# Patient Record
Sex: MALE | Race: BLACK OR AFRICAN AMERICAN | NOT HISPANIC OR LATINO | Employment: UNEMPLOYED | URBAN - METROPOLITAN AREA
[De-identification: names, ages, dates, MRNs, and addresses within clinical notes are randomized per-mention and may not be internally consistent; named-entity substitution may affect disease eponyms.]

---

## 2021-05-14 ENCOUNTER — HISTORICAL (OUTPATIENT)
Dept: LAB | Facility: HOSPITAL | Age: 41
End: 2021-05-14

## 2022-04-07 ENCOUNTER — HISTORICAL (OUTPATIENT)
Dept: ADMINISTRATIVE | Facility: HOSPITAL | Age: 42
End: 2022-04-07
Payer: MEDICAID

## 2022-04-23 VITALS
OXYGEN SATURATION: 97 % | BODY MASS INDEX: 31.94 KG/M2 | HEIGHT: 70 IN | SYSTOLIC BLOOD PRESSURE: 133 MMHG | WEIGHT: 223.13 LBS | DIASTOLIC BLOOD PRESSURE: 88 MMHG

## 2022-05-20 ENCOUNTER — LAB VISIT (OUTPATIENT)
Dept: LAB | Facility: HOSPITAL | Age: 42
End: 2022-05-20
Attending: NURSE PRACTITIONER
Payer: MEDICAID

## 2022-05-20 DIAGNOSIS — Z00.00 WELL ADULT EXAM: Primary | ICD-10-CM

## 2022-05-20 DIAGNOSIS — Z12.5 SCREENING PSA (PROSTATE SPECIFIC ANTIGEN): ICD-10-CM

## 2022-05-20 LAB
ALBUMIN SERPL-MCNC: 3.9 GM/DL (ref 3.5–5)
ALBUMIN/GLOB SERPL: 1.1 RATIO (ref 1.1–2)
ALP SERPL-CCNC: 101 UNIT/L (ref 40–150)
ALT SERPL-CCNC: 13 UNIT/L (ref 0–55)
AST SERPL-CCNC: 23 UNIT/L (ref 5–34)
BASOPHILS # BLD AUTO: 0.07 X10(3)/MCL (ref 0–0.2)
BASOPHILS NFR BLD AUTO: 0.7 %
BILIRUBIN DIRECT+TOT PNL SERPL-MCNC: 0.7 MG/DL
BUN SERPL-MCNC: 10.9 MG/DL (ref 8.9–20.6)
CALCIUM SERPL-MCNC: 9.5 MG/DL (ref 8.4–10.2)
CHLORIDE SERPL-SCNC: 109 MMOL/L (ref 98–107)
CHOLEST SERPL-MCNC: 211 MG/DL
CHOLEST/HDLC SERPL: 7 {RATIO} (ref 0–5)
CO2 SERPL-SCNC: 25 MMOL/L (ref 22–29)
CREAT SERPL-MCNC: 1.01 MG/DL (ref 0.73–1.18)
EOSINOPHIL # BLD AUTO: 0.56 X10(3)/MCL (ref 0–0.9)
EOSINOPHIL NFR BLD AUTO: 5.3 %
ERYTHROCYTE [DISTWIDTH] IN BLOOD BY AUTOMATED COUNT: 13.9 % (ref 11.5–17)
EST. AVERAGE GLUCOSE BLD GHB EST-MCNC: 108.3 MG/DL
GLOBULIN SER-MCNC: 3.6 GM/DL (ref 2.4–3.5)
GLUCOSE SERPL-MCNC: 95 MG/DL (ref 74–100)
HBA1C MFR BLD: 5.4 %
HCT VFR BLD AUTO: 45.9 % (ref 42–52)
HDLC SERPL-MCNC: 29 MG/DL (ref 35–60)
HGB BLD-MCNC: 14.6 GM/DL (ref 14–18)
IMM GRANULOCYTES # BLD AUTO: 0.04 X10(3)/MCL (ref 0–0.02)
IMM GRANULOCYTES NFR BLD AUTO: 0.4 % (ref 0–0.43)
LDLC SERPL CALC-MCNC: 139 MG/DL (ref 50–140)
LYMPHOCYTES # BLD AUTO: 3.93 X10(3)/MCL (ref 0.6–4.6)
LYMPHOCYTES NFR BLD AUTO: 37.4 %
MCH RBC QN AUTO: 27.4 PG (ref 27–31)
MCHC RBC AUTO-ENTMCNC: 31.8 MG/DL (ref 33–36)
MCV RBC AUTO: 86.3 FL (ref 80–94)
MONOCYTES # BLD AUTO: 0.65 X10(3)/MCL (ref 0.1–1.3)
MONOCYTES NFR BLD AUTO: 6.2 %
NEUTROPHILS # BLD AUTO: 5.3 X10(3)/MCL (ref 2.1–9.2)
NEUTROPHILS NFR BLD AUTO: 50 %
PLATELET # BLD AUTO: 317 X10(3)/MCL (ref 130–400)
PMV BLD AUTO: 9.8 FL (ref 9.4–12.4)
POTASSIUM SERPL-SCNC: 3.9 MMOL/L (ref 3.5–5.1)
PROT SERPL-MCNC: 7.5 GM/DL (ref 6.4–8.3)
PSA SERPL-MCNC: 1.45 NG/ML
RBC # BLD AUTO: 5.32 X10(6)/MCL (ref 4.7–6.1)
SODIUM SERPL-SCNC: 143 MMOL/L (ref 136–145)
TRIGL SERPL-MCNC: 214 MG/DL (ref 34–140)
TSH SERPL-ACNC: 2.43 UIU/ML (ref 0.35–4.94)
VLDLC SERPL CALC-MCNC: 43 MG/DL
WBC # SPEC AUTO: 10.5 X10(3)/MCL (ref 4.5–11.5)

## 2022-05-20 PROCEDURE — 36415 COLL VENOUS BLD VENIPUNCTURE: CPT

## 2022-05-20 PROCEDURE — 83036 HEMOGLOBIN GLYCOSYLATED A1C: CPT

## 2022-05-20 PROCEDURE — 84443 ASSAY THYROID STIM HORMONE: CPT

## 2022-05-20 PROCEDURE — 85025 COMPLETE CBC W/AUTO DIFF WBC: CPT

## 2022-05-20 PROCEDURE — 80053 COMPREHEN METABOLIC PANEL: CPT

## 2022-05-20 PROCEDURE — 84153 ASSAY OF PSA TOTAL: CPT

## 2022-05-20 PROCEDURE — 82306 VITAMIN D 25 HYDROXY: CPT

## 2022-05-20 PROCEDURE — 80061 LIPID PANEL: CPT

## 2022-05-21 LAB — DEPRECATED CALCIDIOL+CALCIFEROL SERPL-MC: 21.6 NG/ML (ref 30–80)

## 2022-05-26 ENCOUNTER — OFFICE VISIT (OUTPATIENT)
Dept: FAMILY MEDICINE | Facility: CLINIC | Age: 42
End: 2022-05-26
Payer: MEDICAID

## 2022-05-26 VITALS
HEART RATE: 81 BPM | OXYGEN SATURATION: 98 % | SYSTOLIC BLOOD PRESSURE: 134 MMHG | HEIGHT: 69 IN | BODY MASS INDEX: 33.03 KG/M2 | TEMPERATURE: 98 F | WEIGHT: 223 LBS | DIASTOLIC BLOOD PRESSURE: 87 MMHG | RESPIRATION RATE: 18 BRPM

## 2022-05-26 DIAGNOSIS — Z00.00 WELLNESS EXAMINATION: Primary | ICD-10-CM

## 2022-05-26 DIAGNOSIS — I10 HYPERTENSION, UNSPECIFIED TYPE: ICD-10-CM

## 2022-05-26 DIAGNOSIS — E78.00 ELEVATED CHOLESTEROL: ICD-10-CM

## 2022-05-26 PROCEDURE — 3075F SYST BP GE 130 - 139MM HG: CPT | Mod: CPTII,,, | Performed by: NURSE PRACTITIONER

## 2022-05-26 PROCEDURE — 1159F PR MEDICATION LIST DOCUMENTED IN MEDICAL RECORD: ICD-10-PCS | Mod: CPTII,,, | Performed by: NURSE PRACTITIONER

## 2022-05-26 PROCEDURE — 3075F PR MOST RECENT SYSTOLIC BLOOD PRESS GE 130-139MM HG: ICD-10-PCS | Mod: CPTII,,, | Performed by: NURSE PRACTITIONER

## 2022-05-26 PROCEDURE — 3008F PR BODY MASS INDEX (BMI) DOCUMENTED: ICD-10-PCS | Mod: CPTII,,, | Performed by: NURSE PRACTITIONER

## 2022-05-26 PROCEDURE — 3008F BODY MASS INDEX DOCD: CPT | Mod: CPTII,,, | Performed by: NURSE PRACTITIONER

## 2022-05-26 PROCEDURE — 1160F RVW MEDS BY RX/DR IN RCRD: CPT | Mod: CPTII,,, | Performed by: NURSE PRACTITIONER

## 2022-05-26 PROCEDURE — 1159F MED LIST DOCD IN RCRD: CPT | Mod: CPTII,,, | Performed by: NURSE PRACTITIONER

## 2022-05-26 PROCEDURE — 3079F DIAST BP 80-89 MM HG: CPT | Mod: CPTII,,, | Performed by: NURSE PRACTITIONER

## 2022-05-26 PROCEDURE — 1160F PR REVIEW ALL MEDS BY PRESCRIBER/CLIN PHARMACIST DOCUMENTED: ICD-10-PCS | Mod: CPTII,,, | Performed by: NURSE PRACTITIONER

## 2022-05-26 PROCEDURE — 99396 PR PREVENTIVE VISIT,EST,40-64: ICD-10-PCS | Mod: ,,, | Performed by: NURSE PRACTITIONER

## 2022-05-26 PROCEDURE — 99396 PREV VISIT EST AGE 40-64: CPT | Mod: ,,, | Performed by: NURSE PRACTITIONER

## 2022-05-26 PROCEDURE — 3079F PR MOST RECENT DIASTOLIC BLOOD PRESSURE 80-89 MM HG: ICD-10-PCS | Mod: CPTII,,, | Performed by: NURSE PRACTITIONER

## 2022-05-26 RX ORDER — HYDROCHLOROTHIAZIDE 12.5 MG/1
12.5 TABLET ORAL DAILY
Qty: 90 TABLET | Refills: 1 | Status: SHIPPED | OUTPATIENT
Start: 2022-05-26 | End: 2022-07-10 | Stop reason: SDUPTHER

## 2022-05-26 NOTE — PROGRESS NOTES
"Subjective:       Patient ID: Austyn Parks is a 41 y.o. male.    Chief Complaint: Annual Exam      HPI   This is a 42-year-old  male presents to the clinic today for annual wellness and diarrhea.  The patient's general health status is described as good.  PMH hypertension.  Patient reports compliance with blood pressure medicine.  Denies headaches, dizziness or blurred vision.  Smoking status less than half a pack a day.    Review of Systems   Constitutional: Negative for activity change and fatigue.   Respiratory: Negative for chest tightness and shortness of breath.    Cardiovascular: Negative for chest pain and palpitations.   Gastrointestinal: Negative for abdominal pain.   Neurological: Negative for dizziness, weakness and headaches.   Psychiatric/Behavioral: Negative for self-injury and suicidal ideas.   All other systems reviewed and are negative.           The patients  hx, allergies, medication and and problem list were updated as appropriate.    Objective:       /87 (BP Location: Right arm, Patient Position: Sitting, BP Method: Medium (Automatic))   Pulse 81   Temp 97.9 °F (36.6 °C) (Oral)   Resp 18   Ht 5' 9" (1.753 m)   Wt 101.2 kg (223 lb)   SpO2 98%   BMI 32.93 kg/m²      Physical Exam  Constitutional:       General: He is not in acute distress.     Appearance: Normal appearance. He is not toxic-appearing.   HENT:      Head: Normocephalic.   Eyes:      Pupils: Pupils are equal, round, and reactive to light.   Cardiovascular:      Rate and Rhythm: Normal rate.      Pulses: Normal pulses.   Pulmonary:      Effort: Pulmonary effort is normal.   Abdominal:      General: Bowel sounds are normal.      Palpations: Abdomen is soft.   Musculoskeletal:         General: Normal range of motion.      Cervical back: Neck supple.   Skin:     General: Skin is warm and dry.   Neurological:      Mental Status: He is alert and oriented to person, place, and time.   Psychiatric:         Mood and " Affect: Mood normal.         Behavior: Behavior normal.         Judgment: Judgment normal.           Assessment/Plan   1. Wellness examination  Discussed labs and preventative screenings   Vaccination status     Colon- no family history start screening at 45  PSA- PSA value within normal repeat in 1 year      2. Hypertension, unspecified type  -     hydroCHLOROthiazide (HYDRODIURIL) 12.5 MG Tab  Refill  blood pressure medicine  Controlled with medication.   Continue current medication as prescribed   Low salt/ DASH diet   Discussed lifestyle modifications.   encourage aerobic excise at least 30 mins a day   Monitor BP daily goal less than 140/90.   Denies headaches, blurred vision, or dizziness      3. Elevated cholesterol    Patient reports he was not fasting when he completed lab work.  Total cholesterol 211.   epeat fasting lipid panel in 4 months.  Low fat, low cholesterol diet .  Increase dietary fiber  Avoid fried, fatty , high saturated fats.  Add flaxseed or fish oil omega supplement to diet .   exercise to reduce LDL and raise HDL. Exercise at least 30 mins a day as tolerated         No results found for this or any previous visit (from the past 24 hour(s)).   Follow up in about 4 months (around 9/26/2022).

## 2022-07-10 ENCOUNTER — HOSPITAL ENCOUNTER (EMERGENCY)
Facility: HOSPITAL | Age: 42
Discharge: HOME | End: 2022-07-10
Attending: SPECIALIST
Payer: MEDICAID

## 2022-07-10 VITALS
BODY MASS INDEX: 34.86 KG/M2 | SYSTOLIC BLOOD PRESSURE: 120 MMHG | OXYGEN SATURATION: 96 % | DIASTOLIC BLOOD PRESSURE: 86 MMHG | HEART RATE: 51 BPM | HEIGHT: 68 IN | WEIGHT: 230 LBS | RESPIRATION RATE: 16 BRPM | TEMPERATURE: 99 F

## 2022-07-10 DIAGNOSIS — I10 HYPERTENSION, UNSPECIFIED TYPE: ICD-10-CM

## 2022-07-10 DIAGNOSIS — K59.00 CONSTIPATION, UNSPECIFIED CONSTIPATION TYPE: Primary | ICD-10-CM

## 2022-07-10 DIAGNOSIS — R10.9 ABDOMINAL PAIN: ICD-10-CM

## 2022-07-10 LAB
ALBUMIN SERPL-MCNC: 3.9 GM/DL (ref 3.5–5)
ALBUMIN/GLOB SERPL: 1.2 RATIO (ref 1.1–2)
ALP SERPL-CCNC: 95 UNIT/L (ref 40–150)
ALT SERPL-CCNC: 13 UNIT/L (ref 0–55)
AST SERPL-CCNC: 14 UNIT/L (ref 5–34)
BASOPHILS # BLD AUTO: 0.1 X10(3)/MCL (ref 0–0.2)
BASOPHILS NFR BLD AUTO: 0.7 %
BILIRUBIN DIRECT+TOT PNL SERPL-MCNC: 0.7 MG/DL
BUN SERPL-MCNC: 11 MG/DL (ref 8.9–20.6)
CALCIUM SERPL-MCNC: 9.6 MG/DL (ref 8.4–10.2)
CHLORIDE SERPL-SCNC: 107 MMOL/L (ref 98–107)
CO2 SERPL-SCNC: 21 MMOL/L (ref 22–29)
CREAT SERPL-MCNC: 1.21 MG/DL (ref 0.73–1.18)
EOSINOPHIL # BLD AUTO: 0.31 X10(3)/MCL (ref 0–0.9)
EOSINOPHIL NFR BLD AUTO: 2.1 %
ERYTHROCYTE [DISTWIDTH] IN BLOOD BY AUTOMATED COUNT: 13.4 % (ref 11.5–17)
GLOBULIN SER-MCNC: 3.2 GM/DL (ref 2.4–3.5)
GLUCOSE SERPL-MCNC: 92 MG/DL (ref 74–100)
HCT VFR BLD AUTO: 44.2 % (ref 42–52)
HGB BLD-MCNC: 14.2 GM/DL (ref 14–18)
IMM GRANULOCYTES # BLD AUTO: 0.05 X10(3)/MCL (ref 0–0.04)
IMM GRANULOCYTES NFR BLD AUTO: 0.3 %
LIPASE SERPL-CCNC: 73 U/L
LYMPHOCYTES # BLD AUTO: 3.75 X10(3)/MCL (ref 0.6–4.6)
LYMPHOCYTES NFR BLD AUTO: 25.8 %
MCH RBC QN AUTO: 27.2 PG (ref 27–31)
MCHC RBC AUTO-ENTMCNC: 32.1 MG/DL (ref 33–36)
MCV RBC AUTO: 84.5 FL (ref 80–94)
MONOCYTES # BLD AUTO: 0.96 X10(3)/MCL (ref 0.1–1.3)
MONOCYTES NFR BLD AUTO: 6.6 %
NEUTROPHILS # BLD AUTO: 9.4 X10(3)/MCL (ref 2.1–9.2)
NEUTROPHILS NFR BLD AUTO: 64.5 %
PLATELET # BLD AUTO: 344 X10(3)/MCL (ref 130–400)
PMV BLD AUTO: 9.6 FL (ref 7.4–10.4)
POTASSIUM SERPL-SCNC: 3.8 MMOL/L (ref 3.5–5.1)
PROT SERPL-MCNC: 7.1 GM/DL (ref 6.4–8.3)
RBC # BLD AUTO: 5.23 X10(6)/MCL (ref 4.7–6.1)
SODIUM SERPL-SCNC: 139 MMOL/L (ref 136–145)
WBC # SPEC AUTO: 14.6 X10(3)/MCL (ref 4.5–11.5)

## 2022-07-10 PROCEDURE — 36415 COLL VENOUS BLD VENIPUNCTURE: CPT | Performed by: SPECIALIST

## 2022-07-10 PROCEDURE — 85025 COMPLETE CBC W/AUTO DIFF WBC: CPT | Performed by: SPECIALIST

## 2022-07-10 PROCEDURE — 25000003 PHARM REV CODE 250: Performed by: SPECIALIST

## 2022-07-10 PROCEDURE — 83690 ASSAY OF LIPASE: CPT | Performed by: SPECIALIST

## 2022-07-10 PROCEDURE — 99284 EMERGENCY DEPT VISIT MOD MDM: CPT | Mod: 25

## 2022-07-10 PROCEDURE — 80053 COMPREHEN METABOLIC PANEL: CPT | Performed by: SPECIALIST

## 2022-07-10 RX ORDER — MAG HYDROX/ALUMINUM HYD/SIMETH 200-200-20
5 SUSPENSION, ORAL (FINAL DOSE FORM) ORAL
Status: COMPLETED | OUTPATIENT
Start: 2022-07-10 | End: 2022-07-10

## 2022-07-10 RX ORDER — DICYCLOMINE HYDROCHLORIDE 10 MG/1
20 CAPSULE ORAL ONCE
Status: DISCONTINUED | OUTPATIENT
Start: 2022-07-10 | End: 2022-07-10

## 2022-07-10 RX ORDER — SYRING-NEEDL,DISP,INSUL,0.3 ML 29 G X1/2"
296 SYRINGE, EMPTY DISPOSABLE MISCELLANEOUS ONCE
Qty: 600 ML | Refills: 0 | Status: SHIPPED | OUTPATIENT
Start: 2022-07-10 | End: 2022-07-10

## 2022-07-10 RX ORDER — HYOSCYAMINE SULFATE 0.12 MG/1
0.12 TABLET SUBLINGUAL
Status: COMPLETED | OUTPATIENT
Start: 2022-07-10 | End: 2022-07-10

## 2022-07-10 RX ORDER — DICYCLOMINE HYDROCHLORIDE 10 MG/1
10 CAPSULE ORAL
Status: DISCONTINUED | OUTPATIENT
Start: 2022-07-10 | End: 2022-07-10

## 2022-07-10 RX ORDER — DICYCLOMINE HYDROCHLORIDE 10 MG/1
20 CAPSULE ORAL
Status: DISCONTINUED | OUTPATIENT
Start: 2022-07-10 | End: 2022-07-10

## 2022-07-10 RX ORDER — DICYCLOMINE HYDROCHLORIDE 20 MG/1
20 TABLET ORAL 2 TIMES DAILY
Qty: 10 TABLET | Refills: 0 | Status: SHIPPED | OUTPATIENT
Start: 2022-07-10 | End: 2022-07-15

## 2022-07-10 RX ADMIN — ALUMINUM HYDROXIDE, MAGNESIUM HYDROXIDE, AND SIMETHICONE 5 ML: 200; 200; 20 SUSPENSION ORAL at 05:07

## 2022-07-10 RX ADMIN — HYOSCYAMINE SULFATE 0.12 MG: 0.12 TABLET ORAL at 05:07

## 2022-07-10 NOTE — ED PROVIDER NOTES
Encounter Date: 7/10/2022       History     Chief Complaint   Patient presents with    Abdominal Pain     Reports having abd cramps and constipation for the past 2 days.      40 yo M presents to ED w/ c/o Abdominal cramps generalized and seems to be moving around; trouble having BMs; no N/V; took pepto bismol and drank Sprite without relief. Denies fever/ chills        Review of patient's allergies indicates:  No Known Allergies  Past Medical History:   Diagnosis Date    HTN (hypertension)     Obesity, unspecified      No past surgical history on file.  Family History   Problem Relation Age of Onset    Hypertension Mother     Diabetes Mother     Diabetes Brother      Social History     Tobacco Use    Smoking status: Current Every Day Smoker     Packs/day: 0.50     Types: Cigarettes    Smokeless tobacco: Never Used   Substance Use Topics    Alcohol use: Not Currently    Drug use: Not Currently     Types: Marijuana     Review of Systems   Constitutional: Negative.    Respiratory: Negative.    Cardiovascular: Negative.    Gastrointestinal: Negative.    Genitourinary: Negative.    Musculoskeletal: Negative.    Neurological: Negative.        Physical Exam     Initial Vitals [07/10/22 0505]   BP Pulse Resp Temp SpO2   (!) 143/107 65 16 98.6 °F (37 °C) 95 %      MAP       --         Physical Exam    Nursing note and vitals reviewed.  Constitutional: He appears well-developed and well-nourished.   HENT:   Head: Normocephalic and atraumatic.   Eyes: EOM are normal. Pupils are equal, round, and reactive to light.   Neck: Neck supple.   Normal range of motion.  Cardiovascular: Normal rate, regular rhythm and normal heart sounds.   Pulmonary/Chest: Breath sounds normal.   Abdominal: Abdomen is soft. Bowel sounds are normal. He exhibits distension (mild). There is abdominal tenderness (mild, generalized). There is no rebound and no guarding.   Musculoskeletal:         General: Normal range of motion.      Cervical  back: Normal range of motion and neck supple.     Neurological: He is alert and oriented to person, place, and time.   Skin: Skin is warm and dry.         ED Course   Procedures  Labs Reviewed   COMPREHENSIVE METABOLIC PANEL - Abnormal; Notable for the following components:       Result Value    Carbon Dioxide 21 (*)     Creatinine 1.21 (*)     All other components within normal limits   LIPASE - Abnormal; Notable for the following components:    Lipase Level 73 (*)     All other components within normal limits   CBC WITH DIFFERENTIAL - Abnormal; Notable for the following components:    WBC 14.6 (*)     MCHC 32.1 (*)     Neut # 9.4 (*)     IG# 0.05 (*)     All other components within normal limits   CBC W/ AUTO DIFFERENTIAL    Narrative:     The following orders were created for panel order CBC auto differential.  Procedure                               Abnormality         Status                     ---------                               -----------         ------                     CBC with Differential[[id number]]        Abnormal            Final result                 Please view results for these tests on the individual orders.          Imaging Results          X-Ray Abdomen AP 1 View (KUB) (Preliminary result)  Result time 07/10/22 05:53:13    ED Interpretation by Williams Shafer MD (07/10/22 05:53:13, [facility] - Emergency Dept, Emergency Medicine)    No bowel dilatation, an element of constipation                               Medications   hyoscyamine ODT 0.125 mg (0.125 mg Oral Given 7/10/22 0530)   aluminum-magnesium hydroxide-simethicone 200-200-20 mg/5 mL suspension 5 mL (5 mLs Oral Given 7/10/22 0530)                          Clinical Impression:   Final diagnoses:  [R10.9] Abdominal pain  [K59.00] Constipation, unspecified constipation type (Primary)          ED Disposition Condition    Discharge Stable        ED Prescriptions     Medication Sig Dispense Start Date End Date Auth. Provider     magnesium citrate solution (Expires today) Take 296 mLs by mouth once. May take additional dose if needed for 1 dose 600 mL 7/10/2022 7/10/2022 Ashley Alba MD    dicyclomine (BENTYL) 20 mg tablet Take 1 tablet (20 mg total) by mouth 2 (two) times daily. for 5 days 10 tablet 7/10/2022 7/15/2022 Ashley Alba MD        Follow-up Information     Follow up With Specialties Details Why Contact Info    Wander Car, [facility] In 1 week  [address] [facility] [id number] [phone]             Ashley Alba MD  07/10/22 6117

## 2022-07-10 NOTE — ED NOTES
Reports having generalized abd cramping for the past 2 days, and has been unable to have a normal bowel movement. Denies nvd, but reports associated chills when he catches a cramp.

## 2022-07-11 RX ORDER — HYDROCHLOROTHIAZIDE 12.5 MG/1
12.5 TABLET ORAL DAILY
Qty: 30 TABLET | Refills: 2 | Status: SHIPPED | OUTPATIENT
Start: 2022-07-11 | End: 2023-08-28 | Stop reason: SDUPTHER

## 2022-09-19 DIAGNOSIS — E78.5 HYPERLIPIDEMIA, UNSPECIFIED HYPERLIPIDEMIA TYPE: Primary | ICD-10-CM

## 2022-09-19 DIAGNOSIS — I10 HYPERTENSION, UNSPECIFIED TYPE: ICD-10-CM

## 2022-09-28 ENCOUNTER — OFFICE VISIT (OUTPATIENT)
Dept: FAMILY MEDICINE | Facility: CLINIC | Age: 42
End: 2022-09-28
Payer: MEDICAID

## 2022-09-28 ENCOUNTER — LAB VISIT (OUTPATIENT)
Dept: LAB | Facility: HOSPITAL | Age: 42
End: 2022-09-28
Attending: PHYSICIAN ASSISTANT
Payer: MEDICAID

## 2022-09-28 VITALS
OXYGEN SATURATION: 99 % | BODY MASS INDEX: 32.19 KG/M2 | HEIGHT: 68 IN | HEART RATE: 66 BPM | DIASTOLIC BLOOD PRESSURE: 89 MMHG | SYSTOLIC BLOOD PRESSURE: 127 MMHG | RESPIRATION RATE: 16 BRPM | WEIGHT: 212.38 LBS

## 2022-09-28 DIAGNOSIS — E78.5 HYPERLIPIDEMIA, UNSPECIFIED HYPERLIPIDEMIA TYPE: ICD-10-CM

## 2022-09-28 DIAGNOSIS — Z72.0 TOBACCO USE: ICD-10-CM

## 2022-09-28 DIAGNOSIS — I10 HYPERTENSION, UNSPECIFIED TYPE: ICD-10-CM

## 2022-09-28 DIAGNOSIS — E78.00 HYPERCHOLESTEROLEMIA: Primary | ICD-10-CM

## 2022-09-28 LAB
CHOLEST SERPL-MCNC: 209 MG/DL
CHOLEST/HDLC SERPL: 7 {RATIO} (ref 0–5)
HDLC SERPL-MCNC: 30 MG/DL (ref 35–60)
LDLC SERPL CALC-MCNC: 159 MG/DL (ref 50–140)
TRIGL SERPL-MCNC: 102 MG/DL (ref 34–140)
VLDLC SERPL CALC-MCNC: 20 MG/DL

## 2022-09-28 PROCEDURE — 3079F DIAST BP 80-89 MM HG: CPT | Mod: CPTII,,, | Performed by: PHYSICIAN ASSISTANT

## 2022-09-28 PROCEDURE — 80061 LIPID PANEL: CPT

## 2022-09-28 PROCEDURE — 3074F PR MOST RECENT SYSTOLIC BLOOD PRESSURE < 130 MM HG: ICD-10-PCS | Mod: CPTII,,, | Performed by: PHYSICIAN ASSISTANT

## 2022-09-28 PROCEDURE — 1159F MED LIST DOCD IN RCRD: CPT | Mod: CPTII,,, | Performed by: PHYSICIAN ASSISTANT

## 2022-09-28 PROCEDURE — 1160F PR REVIEW ALL MEDS BY PRESCRIBER/CLIN PHARMACIST DOCUMENTED: ICD-10-PCS | Mod: CPTII,,, | Performed by: PHYSICIAN ASSISTANT

## 2022-09-28 PROCEDURE — 3079F PR MOST RECENT DIASTOLIC BLOOD PRESSURE 80-89 MM HG: ICD-10-PCS | Mod: CPTII,,, | Performed by: PHYSICIAN ASSISTANT

## 2022-09-28 PROCEDURE — 99214 PR OFFICE/OUTPT VISIT, EST, LEVL IV, 30-39 MIN: ICD-10-PCS | Mod: ,,, | Performed by: PHYSICIAN ASSISTANT

## 2022-09-28 PROCEDURE — 3008F BODY MASS INDEX DOCD: CPT | Mod: CPTII,,, | Performed by: PHYSICIAN ASSISTANT

## 2022-09-28 PROCEDURE — 99214 OFFICE O/P EST MOD 30 MIN: CPT | Mod: ,,, | Performed by: PHYSICIAN ASSISTANT

## 2022-09-28 PROCEDURE — 3008F PR BODY MASS INDEX (BMI) DOCUMENTED: ICD-10-PCS | Mod: CPTII,,, | Performed by: PHYSICIAN ASSISTANT

## 2022-09-28 PROCEDURE — 1160F RVW MEDS BY RX/DR IN RCRD: CPT | Mod: CPTII,,, | Performed by: PHYSICIAN ASSISTANT

## 2022-09-28 PROCEDURE — 1159F PR MEDICATION LIST DOCUMENTED IN MEDICAL RECORD: ICD-10-PCS | Mod: CPTII,,, | Performed by: PHYSICIAN ASSISTANT

## 2022-09-28 PROCEDURE — 3074F SYST BP LT 130 MM HG: CPT | Mod: CPTII,,, | Performed by: PHYSICIAN ASSISTANT

## 2022-09-28 PROCEDURE — 36415 COLL VENOUS BLD VENIPUNCTURE: CPT

## 2022-09-28 RX ORDER — ATORVASTATIN CALCIUM 10 MG/1
10 TABLET, FILM COATED ORAL DAILY
Qty: 90 TABLET | Refills: 0 | Status: SHIPPED | OUTPATIENT
Start: 2022-09-28 | End: 2023-10-01 | Stop reason: SDUPTHER

## 2022-09-28 RX ORDER — BUPROPION HYDROCHLORIDE 150 MG/1
150 TABLET ORAL DAILY
Qty: 90 TABLET | Refills: 0 | Status: SHIPPED | OUTPATIENT
Start: 2022-09-28 | End: 2023-04-17

## 2022-09-28 NOTE — PROGRESS NOTES
SUBJECTIVE:     History of Present Illness      Chief Complaint: Follow-up (Discuss lab results.  No complaints)    HPI:  Patient is a 42 y.o. year old male who presents to clinic for lipid panel lab review. Patient has medical history of hypertension. At annual wellness in May patient was found to have elevated total cholesterol at 211, low HDL of 29, normal LDL of 139, and triglycerides of 214. Patient was asked follow up in four months to repeat lipid panel which we will be reviewing today.     Overall patient is doing well. No complaints today. Patient has been focusing on his diet and exercising. Patient is down to 212 lbs from 223 lbs in May. Patient would like to stop smoking. We have prescribed Wellbutrin in the past although patient took the medication for a couple of weeks and stopped as he felt it did not help with smoking cessation. Denies side effects of the medication.     Answers submitted by the patient for this visit:  Review of Systems Questionnaire (Submitted on 9/27/2022)  activity change: No  unexpected weight change: No  neck pain: No  hearing loss: No  rhinorrhea: No  trouble swallowing: No  eye discharge: No  visual disturbance: No  chest tightness: No  wheezing: No  chest pain: No  palpitations: No  blood in stool: No  constipation: No  vomiting: No  diarrhea: No  polydipsia: No  polyuria: No  difficulty urinating: No  urgency: No  hematuria: No  joint swelling: No  arthralgias: No  headaches: Yes  weakness: No  confusion: No  dysphoric mood: No    Review of Systems:  A comprehensive review of systems was performed and was negative except as noted in HPI     Previous History      Review of patient's allergies indicates:  No Known Allergies    Past Medical History:   Diagnosis Date    HTN (hypertension)     Obesity, unspecified      Current Outpatient Medications   Medication Instructions    atorvastatin (LIPITOR) 10 mg, Oral, Daily    buPROPion (WELLBUTRIN XL) 150 mg, Oral, Daily     "hydroCHLOROthiazide (HYDRODIURIL) 12.5 mg, Oral, Daily     History reviewed. No pertinent surgical history.  Family History   Problem Relation Age of Onset    Hypertension Mother     Diabetes Mother     Diabetes Brother      Social History     Tobacco Use    Smoking status: Every Day     Packs/day: 0.50     Types: Cigarettes    Smokeless tobacco: Never   Substance Use Topics    Alcohol use: Not Currently    Drug use: Not Currently     Types: Marijuana      Health Maintenance      Health Maintenance   Topic Date Due    Hepatitis C Screening  Never done    TETANUS VACCINE  Never done    Lipid Panel  09/28/2027     OBJECTIVE:     Physical Exam      Vital Signs Reviewed   /89   Pulse 66   Resp 16   Ht 5' 8" (1.727 m)   Wt 96.3 kg (212 lb 6.4 oz)   SpO2 99%   BMI 32.30 kg/m²     Physical Exam:  General: Alert, well nourished, no acute distress, non-toxic appearing.   Eyes: Anicteric sclera, without conjunctival injection, normal lids, no purulent drainage, EOMs grossly intact.   Cardio: Normal rate and rhythm without murmurs, gallops, or rubs.   Resp: Respirations even and unlabored, clear to auscultation bilaterally.   Skin: No rashes or open lesions noted.   MSK: No swelling. No abrasions or signs of trauma. Ambulating without assistance.   Neuro: CN1-12 intact grossly. No focal deficits noted. Facial expressions even.      Labs     Results for orders placed or performed in visit on 09/28/22   Lipid Panel   Result Value Ref Range    Cholesterol Total 209 (H) <=200 mg/dL    HDL Cholesterol 30 (L) 35 - 60 mg/dL    Triglyceride 102 34 - 140 mg/dL    Cholesterol/HDL Ratio 7 (H) 0 - 5    Very Low Density Lipoprotein 20     LDL Cholesterol 159.00 (H) 50.00 - 140.00 mg/dL      Assessment/Plan      1. Hypercholesterolemia   Ten year cardiovascular risk score for heart disease or stroke is 12.0%.  According to the ACC/AHA guidelines patient should be on low dose high-intensity statin.    Risk score discussed with " patient.   Start Atorvastatin 10 mg daily     Discussed and encouraged daily exercise at least 30 minutes 5 times per week. Encouraged low fat, low cholesterol diet. Reccomended increasing dietary fiber.     Follow up in 3 months with lipid panel and CMP prior.      2.  Tobacco use   Discussed smoking cessation with patient.   Would like to try Wellbutrin again.   Wellbutrin 150 mg XR daily.   Follow up in 3 months to discuss response to medication and further plan of care.        Orders Placed This Encounter    Lipid Panel    Comprehensive Metabolic Panel    buPROPion (WELLBUTRIN XL) 150 MG TB24 tablet    atorvastatin (LIPITOR) 10 MG tablet      Medication List with Changes/Refills   New Medications    ATORVASTATIN (LIPITOR) 10 MG TABLET    Take 1 tablet (10 mg total) by mouth once daily.    BUPROPION (WELLBUTRIN XL) 150 MG TB24 TABLET    Take 1 tablet (150 mg total) by mouth once daily.   Current Medications    HYDROCHLOROTHIAZIDE (HYDRODIURIL) 12.5 MG TAB    Take 1 tablet (12.5 mg total) by mouth once daily.     Shaneka Jeffers PA-C    Future Appointments   Date Time Provider Department Center   12/21/2022 10:00 AM RENETTA Sepulveda [facility] TAYLOR Ordoñez

## 2022-12-11 ENCOUNTER — HOSPITAL ENCOUNTER (EMERGENCY)
Facility: HOSPITAL | Age: 42
Discharge: HOME | End: 2022-12-11
Attending: FAMILY MEDICINE
Payer: MEDICAID

## 2022-12-11 VITALS
TEMPERATURE: 98 F | HEART RATE: 63 BPM | DIASTOLIC BLOOD PRESSURE: 72 MMHG | WEIGHT: 195 LBS | BODY MASS INDEX: 30.61 KG/M2 | HEIGHT: 67 IN | SYSTOLIC BLOOD PRESSURE: 116 MMHG | RESPIRATION RATE: 20 BRPM | OXYGEN SATURATION: 99 %

## 2022-12-11 DIAGNOSIS — R10.13 EPIGASTRIC ABDOMINAL PAIN: Primary | ICD-10-CM

## 2022-12-11 DIAGNOSIS — J10.1 INFLUENZA A: ICD-10-CM

## 2022-12-11 LAB
ABS NEUT CALC (OHS): 1.98 X10(3)/MCL (ref 2.1–9.2)
ALBUMIN SERPL-MCNC: 3.9 GM/DL (ref 3.5–5)
ALBUMIN/GLOB SERPL: 1.1 RATIO (ref 1.1–2)
ALP SERPL-CCNC: 86 UNIT/L (ref 40–150)
ALT SERPL-CCNC: 13 UNIT/L (ref 0–55)
ANISOCYTOSIS BLD QL SMEAR: ABNORMAL
APPEARANCE UR: CLEAR
AST SERPL-CCNC: 20 UNIT/L (ref 5–34)
BACTERIA #/AREA URNS AUTO: NORMAL /HPF
BASOPHILS NFR BLD MANUAL: 0 % (ref 0–2)
BASOPHILS NFR BLD MANUAL: 0 X10(3)/MCL (ref 0–0.2)
BILIRUB UR QL STRIP.AUTO: NEGATIVE MG/DL
BILIRUBIN DIRECT+TOT PNL SERPL-MCNC: 0.5 MG/DL
BUN SERPL-MCNC: 10.1 MG/DL (ref 8.9–20.6)
CALCIUM SERPL-MCNC: 9.3 MG/DL (ref 8.4–10.2)
CHLORIDE SERPL-SCNC: 104 MMOL/L (ref 98–107)
CO2 SERPL-SCNC: 22 MMOL/L (ref 22–29)
COLOR UR AUTO: YELLOW
CREAT SERPL-MCNC: 1.05 MG/DL (ref 0.73–1.18)
EOSINOPHIL NFR BLD MANUAL: 0.05 X10(3)/MCL (ref 0–0.9)
EOSINOPHIL NFR BLD MANUAL: 1 % (ref 0–8)
ERYTHROCYTE [DISTWIDTH] IN BLOOD BY AUTOMATED COUNT: 13.2 % (ref 11.5–17)
FLUAV AG UPPER RESP QL IA.RAPID: DETECTED
FLUBV AG UPPER RESP QL IA.RAPID: NOT DETECTED
GFR SERPLBLD CREATININE-BSD FMLA CKD-EPI: >60 MLS/MIN/1.73/M2
GLOBULIN SER-MCNC: 3.4 GM/DL (ref 2.4–3.5)
GLUCOSE SERPL-MCNC: 83 MG/DL (ref 74–100)
GLUCOSE UR QL STRIP.AUTO: NEGATIVE MG/DL
HCT VFR BLD AUTO: 44 % (ref 42–52)
HGB BLD-MCNC: 14.4 GM/DL (ref 14–18)
KETONES UR QL STRIP.AUTO: 15 MG/DL
LEUKOCYTE ESTERASE UR QL STRIP.AUTO: NEGATIVE UNIT/L
LIPASE SERPL-CCNC: 100 U/L
LYMPHOCYTES NFR BLD MANUAL: 2.76 X10(3)/MCL
LYMPHOCYTES NFR BLD MANUAL: 53 % (ref 13–40)
MCH RBC QN AUTO: 27.5 PG (ref 27–31)
MCHC RBC AUTO-ENTMCNC: 32.7 MG/DL (ref 33–36)
MCV RBC AUTO: 84 FL (ref 80–94)
MONOCYTES NFR BLD MANUAL: 0.42 X10(3)/MCL (ref 0.1–1.3)
MONOCYTES NFR BLD MANUAL: 8 % (ref 2–11)
NEUTROPHILS NFR BLD MANUAL: 37 % (ref 47–80)
NEUTS BAND NFR BLD MANUAL: 1 % (ref 0–11)
NITRITE UR QL STRIP.AUTO: NEGATIVE
PH UR STRIP.AUTO: 6.5 [PH]
PLATELET # BLD AUTO: 276 X10(3)/MCL (ref 130–400)
PLATELET # BLD EST: NORMAL 10*3/UL
PMV BLD AUTO: 10.3 FL (ref 7.4–10.4)
POIKILOCYTOSIS BLD QL SMEAR: ABNORMAL
POTASSIUM SERPL-SCNC: 3.4 MMOL/L (ref 3.5–5.1)
PROT SERPL-MCNC: 7.3 GM/DL (ref 6.4–8.3)
PROT UR QL STRIP.AUTO: NEGATIVE MG/DL
RBC # BLD AUTO: 5.24 X10(6)/MCL (ref 4.7–6.1)
RBC #/AREA URNS AUTO: NORMAL /HPF
RBC UR QL AUTO: ABNORMAL UNIT/L
SARS-COV-2 RNA RESP QL NAA+PROBE: NOT DETECTED
SODIUM SERPL-SCNC: 140 MMOL/L (ref 136–145)
SP GR UR STRIP.AUTO: 1.01
SQUAMOUS #/AREA URNS AUTO: NORMAL /HPF
UROBILINOGEN UR STRIP-ACNC: 0.2 MG/DL
WBC # SPEC AUTO: 5.2 X10(3)/MCL (ref 4.5–11.5)
WBC #/AREA URNS AUTO: NORMAL /HPF

## 2022-12-11 PROCEDURE — 81001 URINALYSIS AUTO W/SCOPE: CPT | Performed by: FAMILY MEDICINE

## 2022-12-11 PROCEDURE — 81003 URINALYSIS AUTO W/O SCOPE: CPT | Performed by: FAMILY MEDICINE

## 2022-12-11 PROCEDURE — 63600175 PHARM REV CODE 636 W HCPCS: Performed by: FAMILY MEDICINE

## 2022-12-11 PROCEDURE — 25000003 PHARM REV CODE 250: Performed by: SPECIALIST

## 2022-12-11 PROCEDURE — 96374 THER/PROPH/DIAG INJ IV PUSH: CPT

## 2022-12-11 PROCEDURE — 25000003 PHARM REV CODE 250: Performed by: FAMILY MEDICINE

## 2022-12-11 PROCEDURE — 0240U COVID/FLU A&B PCR: CPT | Performed by: FAMILY MEDICINE

## 2022-12-11 PROCEDURE — 85027 COMPLETE CBC AUTOMATED: CPT | Performed by: FAMILY MEDICINE

## 2022-12-11 PROCEDURE — 96375 TX/PRO/DX INJ NEW DRUG ADDON: CPT

## 2022-12-11 PROCEDURE — 80053 COMPREHEN METABOLIC PANEL: CPT | Performed by: FAMILY MEDICINE

## 2022-12-11 PROCEDURE — 83690 ASSAY OF LIPASE: CPT | Performed by: FAMILY MEDICINE

## 2022-12-11 PROCEDURE — 96361 HYDRATE IV INFUSION ADD-ON: CPT

## 2022-12-11 PROCEDURE — 99285 EMERGENCY DEPT VISIT HI MDM: CPT | Mod: 25

## 2022-12-11 PROCEDURE — 85060 BLOOD SMEAR INTERPRETATION: CPT | Performed by: FAMILY MEDICINE

## 2022-12-11 RX ORDER — MAG HYDROX/ALUMINUM HYD/SIMETH 200-200-20
5 SUSPENSION, ORAL (FINAL DOSE FORM) ORAL
Status: COMPLETED | OUTPATIENT
Start: 2022-12-11 | End: 2022-12-11

## 2022-12-11 RX ORDER — KETOROLAC TROMETHAMINE 30 MG/ML
30 INJECTION, SOLUTION INTRAMUSCULAR; INTRAVENOUS
Status: COMPLETED | OUTPATIENT
Start: 2022-12-11 | End: 2022-12-11

## 2022-12-11 RX ORDER — HYOSCYAMINE SULFATE 0.125 MG
125 TABLET ORAL EVERY 4 HOURS PRN
Qty: 20 TABLET | Refills: 0 | Status: SHIPPED | OUTPATIENT
Start: 2022-12-11 | End: 2022-12-12

## 2022-12-11 RX ORDER — MORPHINE SULFATE 4 MG/ML
4 INJECTION, SOLUTION INTRAMUSCULAR; INTRAVENOUS
Status: COMPLETED | OUTPATIENT
Start: 2022-12-11 | End: 2022-12-11

## 2022-12-11 RX ORDER — ONDANSETRON 4 MG/1
4 TABLET, FILM COATED ORAL EVERY 6 HOURS
Qty: 12 TABLET | Refills: 0 | Status: SHIPPED | OUTPATIENT
Start: 2022-12-11 | End: 2023-10-24

## 2022-12-11 RX ORDER — HYOSCYAMINE SULFATE 0.12 MG/1
0.12 TABLET SUBLINGUAL
Status: COMPLETED | OUTPATIENT
Start: 2022-12-11 | End: 2022-12-11

## 2022-12-11 RX ORDER — LIDOCAINE HYDROCHLORIDE 20 MG/ML
5 SOLUTION OROPHARYNGEAL
Status: COMPLETED | OUTPATIENT
Start: 2022-12-11 | End: 2022-12-11

## 2022-12-11 RX ORDER — KETOROLAC TROMETHAMINE 30 MG/ML
30 INJECTION, SOLUTION INTRAMUSCULAR; INTRAVENOUS
Status: DISCONTINUED | OUTPATIENT
Start: 2022-12-11 | End: 2022-12-11

## 2022-12-11 RX ADMIN — MORPHINE SULFATE 4 MG: 4 INJECTION, SOLUTION INTRAMUSCULAR; INTRAVENOUS at 06:12

## 2022-12-11 RX ADMIN — SODIUM CHLORIDE 1000 ML: 9 INJECTION, SOLUTION INTRAVENOUS at 04:12

## 2022-12-11 RX ADMIN — HYOSCYAMINE SULFATE 0.12 MG: 0.12 TABLET ORAL at 07:12

## 2022-12-11 RX ADMIN — LIDOCAINE HYDROCHLORIDE 5 ML: 20 SOLUTION ORAL; TOPICAL at 07:12

## 2022-12-11 RX ADMIN — KETOROLAC TROMETHAMINE 30 MG: 30 INJECTION, SOLUTION INTRAMUSCULAR at 04:12

## 2022-12-11 RX ADMIN — ALUMINUM HYDROXIDE, MAGNESIUM HYDROXIDE, AND SIMETHICONE 5 ML: 200; 200; 20 SUSPENSION ORAL at 07:12

## 2022-12-11 NOTE — ED NOTES
Pt present with  mid  abd pain  w reflux intermittently x2 days- reports only drinking liquids= states felt nauseated x2 days and not hungry.  Deies fever or cough.  Reports diarrhea but states he figured as much since hes only been drinking gatorades. Has not taken any meds for reflux.   Bbs cta, [id number]- abd soft w  +bs x4 quads/

## 2022-12-11 NOTE — ED PROVIDER NOTES
Encounter Date: 12/11/2022       History     Chief Complaint   Patient presents with    Abdominal Pain     States abd pain x yesterday, mid abd radiating to both sides.     42-year-old male patient with 10/10 abdominal pain that states started yesterday patient has more mid abdominal pain stating that it is radiating off to both sides patient has no hematuria no dysuria no frequency or urgency no known history of abdominal conditions no nausea vomiting today no fever chills night sweats      Review of patient's allergies indicates:  No Known Allergies  Past Medical History:   Diagnosis Date    HTN (hypertension)     Obesity, unspecified      No past surgical history on file.  Family History   Problem Relation Age of Onset    Hypertension Mother     Diabetes Mother     Diabetes Brother      Social History     Tobacco Use    Smoking status: Every Day     Packs/day: 0.50     Types: Cigarettes    Smokeless tobacco: Never   Substance Use Topics    Alcohol use: Not Currently    Drug use: Not Currently     Types: Marijuana     Review of Systems   Constitutional:  Negative for fever.   HENT:  Negative for sore throat.    Respiratory:  Negative for shortness of breath.    Cardiovascular:  Negative for chest pain.   Gastrointestinal:  Positive for abdominal pain. Negative for nausea.   Genitourinary:  Negative for dysuria.   Musculoskeletal:  Negative for back pain.   Skin:  Negative for rash.   Neurological:  Negative for weakness.   Hematological:  Does not bruise/bleed easily.   All other systems reviewed and are negative.    Physical Exam     Initial Vitals [12/11/22 1549]   BP Pulse Resp Temp SpO2   (!) 145/89 63 20 98.4 °F (36.9 °C) 99 %      MAP       --         Physical Exam    Nursing note and vitals reviewed.  Constitutional: He appears well-developed and well-nourished. He is not diaphoretic. No distress.   HENT:   Head: Normocephalic and atraumatic.   Right Ear: External ear normal.   Left Ear: External ear  normal.   Nose: Nose normal.   Mouth/Throat: Oropharynx is clear and moist. No oropharyngeal exudate.   Eyes: Conjunctivae and EOM are normal. Pupils are equal, round, and reactive to light. Right eye exhibits no discharge. Left eye exhibits no discharge.   Neck: Neck supple. No thyromegaly present. No tracheal deviation present. No JVD present.   Normal range of motion.  Cardiovascular:  Normal rate, regular rhythm, normal heart sounds and intact distal pulses.     Exam reveals no gallop and no friction rub.       No murmur heard.  Pulmonary/Chest: Breath sounds normal. No stridor. No respiratory distress. He has no wheezes. He has no rhonchi. He has no rales. He exhibits no tenderness.   Abdominal: Abdomen is soft. Bowel sounds are normal. He exhibits no distension. There is abdominal tenderness. There is no rebound and no guarding.   Musculoskeletal:         General: No tenderness or edema. Normal range of motion.      Cervical back: Normal range of motion and neck supple.     Lymphadenopathy:     He has no cervical adenopathy.   Neurological: He is alert and oriented to person, place, and time. He has normal strength and normal reflexes. No cranial nerve deficit or sensory deficit. GCS score is 15. GCS eye subscore is 4. GCS verbal subscore is 5. GCS motor subscore is 6.   Skin: Skin is warm and dry. No rash and no abscess noted. No erythema.   Psychiatric: He has a normal mood and affect. His behavior is normal. Judgment and thought content normal.       ED Course   Procedures  Labs Reviewed   COMPREHENSIVE METABOLIC PANEL - Abnormal; Notable for the following components:       Result Value    Potassium Level 3.4 (*)     All other components within normal limits   LIPASE - Abnormal; Notable for the following components:    Lipase Level 100 (*)     All other components within normal limits   URINALYSIS, REFLEX TO URINE CULTURE - Abnormal; Notable for the following components:    Ketones, UA 15 (*)     Blood, UA  Trace-Intact (*)     All other components within normal limits   CBC WITH DIFFERENTIAL - Abnormal; Notable for the following components:    MCHC 32.7 (*)     All other components within normal limits   COVID/FLU A&B PCR - Abnormal; Notable for the following components:    Influenza A PCR Detected (*)     All other components within normal limits    Narrative:     The Xpert Xpress SARS-CoV-2/FLU/RSV plus is a rapid, multiplexed real-time PCR test intended for the simultaneous qualitative detection and differentiation of SARS-CoV-2, Influenza A, Influenza B, and respiratory syncytial virus (RSV) viral RNA in either nasopharyngeal swab or nasal swab specimens.         MANUAL DIFFERENTIAL - Abnormal; Notable for the following components:    Neut Man 37 (*)     Lymph Man 53 (*)     Abs Neut calc 1.976 (*)     Anisocyte 1+ (*)     Poik 1+ (*)     All other components within normal limits   URINALYSIS, MICROSCOPIC - Normal   CBC W/ AUTO DIFFERENTIAL    Narrative:     The following orders were created for panel order CBC W/ AUTO DIFFERENTIAL.  Procedure                               Abnormality         Status                     ---------                               -----------         ------                     CBC with Differential[[id number]]        Abnormal            Final result               Manual Differential[[id number]]          Abnormal            Final result                 Please view results for these tests on the individual orders.   PATH REVIEW OF BLOOD SMEAR          Imaging Results              CT Renal Stone Study ABD Pelvis WO (Final result)  Result time 12/11/22 18:07:09      Final result by Satish Payne MD (12/11/22 18:07:09)                   Impression:      1. No acute abdominopelvic findings on this noncontrast scan.  2. Right renal lesion for which outpatient ultrasound is recommended.      Electronically signed by: Satish Payne  Date:    12/11/2022  Time:    18:07               Narrative:     EXAMINATION:  CT RENAL STONE STUDY ABD PELVIS WO    CLINICAL HISTORY:  Nephrolithiasis, uncomplicated;    TECHNIQUE:  Helical acquisition through the abdomen and pelvis without IV contrast.  Lack of contrast limits evaluation of solid organs and vascular structures .  Three plane reconstructions were provided for review.  mGycm. Automatic exposure control, adjustment of mA/kV or iterative reconstruction technique was used to reduce radiation.    COMPARISON:  No prior CT    FINDINGS:  The limited imaged lung bases are clear.    There is no significant abnormality of the noncontrast liver, gallbladder, spleen, pancreas or adrenals.    There is no hydronephrosis.  No measurable urinary tract calculi are seen.  There is indeterminate 25 mm exophytic lesion of the right kidney on image 32 series 3.    There is no bowel obstruction.  The appendix is normal.  There is colonic diverticulosis without CT evidence of diverticulitis.  No free air.    Urinary bladder is unremarkable.  No pelvic free fluid.  The abdominal aorta is normal in caliber.  There is moderate atherosclerotic disease.    Mild degenerative change of the spine.                                       Medications   sodium chloride 0.9% bolus 1,000 mL (0 mLs Intravenous Stopped 12/11/22 1744)   ketorolac injection 30 mg (30 mg Intravenous Given 12/11/22 1643)   morphine injection 4 mg (4 mg Intravenous Given 12/11/22 1810)   hyoscyamine ODT 0.125 mg (0.125 mg Oral Given 12/11/22 1909)   aluminum-magnesium hydroxide-simethicone 200-200-20 mg/5 mL suspension 5 mL (5 mLs Oral Given 12/11/22 1910)   LIDOcaine HCl 2% oral solution 5 mL (5 mLs Oral Given 12/11/22 1910)     Medical Decision Making:   Differential Diagnosis:   Viral illness, appendicitis, cholecystitis, hepatitis, pancreatitis, bowel obstruction, constipation   Clinical Tests:   Lab Tests: Ordered and Reviewed  Radiological Study: Ordered and Reviewed  ED Management:  Patient's symptoms improved  "with a GI cocktail; he will be given Levsin for abdominal cramping and instructed to follow up with his primary care physician  He had a negative CT of the abdomen and unremarkable lab work               Patient Vitals for the past 24 hrs:   BP Temp Pulse Resp SpO2 Height Weight   12/11/22 1830 116/72 -- -- -- -- -- --   12/11/22 1810 -- -- -- 20 -- -- --   12/11/22 1549 (!) 145/89 98.4 °F (36.9 °C) 63 20 99 % 5' 7" (1.702 m) 88.5 kg (195 lb)       The patient is resting comfortably and in no acute distress.   He states that his symptoms have improved after treatment in Emergency Department. I personally discussed his test results and treatment plan.  Gave strict ED precautions.  Specific conditions for return to the emergency department and importance of follow up with his primary care provided or the physician listed on the discharge instructions.  Patient voices understanding and agrees to the plan discussed. All patients' questions have been answered at this time.   He has remained hemodynamically stable throughout entire stay in ED and is stable for discharge home.        Clinical Impression:   Final diagnoses:  [R10.13] Epigastric abdominal pain (Primary)  [J10.1] Influenza A        ED Disposition Condition    Discharge Stable          ED Prescriptions       Medication Sig Dispense Start Date End Date Auth. Provider    hyoscyamine (ANASPAZ,LEVSIN) 0.125 mg Tab Take 1 tablet (125 mcg total) by mouth every 4 (four) hours as needed. 20 tablet 12/11/2022 1/10/2023 Williams Shafer MD    ondansetron (ZOFRAN) 4 MG tablet Take 1 tablet (4 mg total) by mouth every 6 (six) hours. 12 tablet 12/11/2022 -- Williams Shafer MD          Follow-up Information       Follow up With Specialties Details Why Contact Info    Wander Car, [facility] In 1 day As needed [address] [facility] [id number] [phone]               Williams Shafer MD  12/12/22 0031    "

## 2022-12-12 LAB — HEMATOLOGIST REVIEW: NORMAL

## 2022-12-14 ENCOUNTER — HOSPITAL ENCOUNTER (EMERGENCY)
Facility: HOSPITAL | Age: 42
Discharge: HOME | End: 2022-12-14
Attending: STUDENT IN AN ORGANIZED HEALTH CARE EDUCATION/TRAINING PROGRAM
Payer: MEDICAID

## 2022-12-14 VITALS
HEART RATE: 60 BPM | OXYGEN SATURATION: 100 % | DIASTOLIC BLOOD PRESSURE: 95 MMHG | SYSTOLIC BLOOD PRESSURE: 160 MMHG | TEMPERATURE: 100 F | RESPIRATION RATE: 18 BRPM

## 2022-12-14 DIAGNOSIS — K29.70 GASTRITIS, PRESENCE OF BLEEDING UNSPECIFIED, UNSPECIFIED CHRONICITY, UNSPECIFIED GASTRITIS TYPE: Primary | ICD-10-CM

## 2022-12-14 LAB
ALBUMIN SERPL-MCNC: 4 G/DL (ref 3.5–5)
ALBUMIN/GLOB SERPL: 1.2 RATIO (ref 1.1–2)
ALP SERPL-CCNC: 77 UNIT/L (ref 40–150)
ALT SERPL-CCNC: 11 UNIT/L (ref 0–55)
AMPHET UR QL SCN: NEGATIVE
APPEARANCE UR: CLEAR
AST SERPL-CCNC: 16 UNIT/L (ref 5–34)
BACTERIA #/AREA URNS AUTO: NORMAL /HPF
BARBITURATE SCN PRESENT UR: NEGATIVE
BASOPHILS # BLD AUTO: 0.02 X10(3)/MCL (ref 0–0.2)
BASOPHILS NFR BLD AUTO: 0.2 %
BENZODIAZ UR QL SCN: NEGATIVE
BILIRUB UR QL STRIP.AUTO: NEGATIVE MG/DL
BILIRUBIN DIRECT+TOT PNL SERPL-MCNC: 1 MG/DL
BUN SERPL-MCNC: 8.9 MG/DL (ref 8.9–20.6)
CALCIUM SERPL-MCNC: 9.5 MG/DL (ref 8.4–10.2)
CANNABINOIDS UR QL SCN: POSITIVE
CHLORIDE SERPL-SCNC: 107 MMOL/L (ref 98–107)
CO2 SERPL-SCNC: 20 MMOL/L (ref 22–29)
COCAINE UR QL SCN: NEGATIVE
COLOR UR AUTO: YELLOW
CREAT SERPL-MCNC: 1.07 MG/DL (ref 0.73–1.18)
EOSINOPHIL # BLD AUTO: 0.06 X10(3)/MCL (ref 0–0.9)
EOSINOPHIL NFR BLD AUTO: 0.5 %
ERYTHROCYTE [DISTWIDTH] IN BLOOD BY AUTOMATED COUNT: 12.4 % (ref 11.6–14.4)
GFR SERPLBLD CREATININE-BSD FMLA CKD-EPI: >60 MLS/MIN/1.73/M2
GLOBULIN SER-MCNC: 3.4 GM/DL (ref 2.4–3.5)
GLUCOSE SERPL-MCNC: 85 MG/DL (ref 74–100)
GLUCOSE UR QL STRIP.AUTO: NEGATIVE MG/DL
HCT VFR BLD AUTO: 43.9 % (ref 42–52)
HGB BLD-MCNC: 14.3 GM/DL (ref 14–18)
IMM GRANULOCYTES # BLD AUTO: 0.03 X10(3)/MCL (ref 0–0.04)
IMM GRANULOCYTES NFR BLD AUTO: 0.3 %
KETONES UR QL STRIP.AUTO: 40 MG/DL
LEUKOCYTE ESTERASE UR QL STRIP.AUTO: NEGATIVE UNIT/L
LIPASE SERPL-CCNC: 117 U/L
LYMPHOCYTES # BLD AUTO: 3.09 X10(3)/MCL (ref 0.6–4.6)
LYMPHOCYTES NFR BLD AUTO: 27.7 %
MCH RBC QN AUTO: 26.8 PG
MCHC RBC AUTO-ENTMCNC: 32.6 MG/DL (ref 33–36)
MCV RBC AUTO: 82.4 FL (ref 80–94)
MONOCYTES # BLD AUTO: 0.66 X10(3)/MCL (ref 0.1–1.3)
MONOCYTES NFR BLD AUTO: 5.9 %
NEUTROPHILS # BLD AUTO: 7.29 X10(3)/MCL (ref 2.1–9.2)
NEUTROPHILS NFR BLD AUTO: 65.4 %
NITRITE UR QL STRIP.AUTO: NEGATIVE
OPIATES UR QL SCN: NEGATIVE
PCP UR QL: NEGATIVE
PH UR STRIP.AUTO: 8.5 [PH]
PH UR: 8.5 [PH] (ref 3–11)
PLATELET # BLD AUTO: 299 X10(3)/MCL (ref 140–371)
PMV BLD AUTO: 10.3 FL (ref 9.4–12.4)
POTASSIUM SERPL-SCNC: 3.5 MMOL/L (ref 3.5–5.1)
PROT SERPL-MCNC: 7.4 GM/DL (ref 6.4–8.3)
PROT UR QL STRIP.AUTO: NEGATIVE MG/DL
RBC # BLD AUTO: 5.33 X10(6)/MCL (ref 4.7–6.1)
RBC #/AREA URNS AUTO: NORMAL /HPF
RBC UR QL AUTO: NEGATIVE UNIT/L
SODIUM SERPL-SCNC: 140 MMOL/L (ref 136–145)
SP GR UR STRIP.AUTO: 1.02
SQUAMOUS #/AREA URNS AUTO: NORMAL /HPF
UROBILINOGEN UR STRIP-ACNC: 2 MG/DL
WBC # SPEC AUTO: 11.2 X10(3)/MCL (ref 4.5–11.5)
WBC #/AREA URNS AUTO: NORMAL /HPF

## 2022-12-14 PROCEDURE — 25000003 PHARM REV CODE 250: Performed by: STUDENT IN AN ORGANIZED HEALTH CARE EDUCATION/TRAINING PROGRAM

## 2022-12-14 PROCEDURE — 83690 ASSAY OF LIPASE: CPT | Performed by: STUDENT IN AN ORGANIZED HEALTH CARE EDUCATION/TRAINING PROGRAM

## 2022-12-14 PROCEDURE — 80053 COMPREHEN METABOLIC PANEL: CPT | Performed by: STUDENT IN AN ORGANIZED HEALTH CARE EDUCATION/TRAINING PROGRAM

## 2022-12-14 PROCEDURE — 81001 URINALYSIS AUTO W/SCOPE: CPT | Performed by: STUDENT IN AN ORGANIZED HEALTH CARE EDUCATION/TRAINING PROGRAM

## 2022-12-14 PROCEDURE — 99284 EMERGENCY DEPT VISIT MOD MDM: CPT | Mod: 25

## 2022-12-14 PROCEDURE — 85025 COMPLETE CBC W/AUTO DIFF WBC: CPT | Performed by: STUDENT IN AN ORGANIZED HEALTH CARE EDUCATION/TRAINING PROGRAM

## 2022-12-14 PROCEDURE — 80307 DRUG TEST PRSMV CHEM ANLYZR: CPT | Performed by: STUDENT IN AN ORGANIZED HEALTH CARE EDUCATION/TRAINING PROGRAM

## 2022-12-14 PROCEDURE — 81003 URINALYSIS AUTO W/O SCOPE: CPT | Performed by: STUDENT IN AN ORGANIZED HEALTH CARE EDUCATION/TRAINING PROGRAM

## 2022-12-14 RX ORDER — MAG HYDROX/ALUMINUM HYD/SIMETH 200-200-20
30 SUSPENSION, ORAL (FINAL DOSE FORM) ORAL ONCE
Status: COMPLETED | OUTPATIENT
Start: 2022-12-14 | End: 2022-12-14

## 2022-12-14 RX ORDER — SUCRALFATE 1 G/1
1 TABLET ORAL
Qty: 15 TABLET | Refills: 0 | Status: SHIPPED | OUTPATIENT
Start: 2022-12-14 | End: 2023-01-11

## 2022-12-14 RX ORDER — PANTOPRAZOLE SODIUM 40 MG/1
40 TABLET, DELAYED RELEASE ORAL DAILY
Qty: 30 TABLET | Refills: 1 | Status: SHIPPED | OUTPATIENT
Start: 2022-12-14 | End: 2023-01-11

## 2022-12-14 RX ORDER — LIDOCAINE HYDROCHLORIDE 20 MG/ML
15 SOLUTION OROPHARYNGEAL ONCE
Status: COMPLETED | OUTPATIENT
Start: 2022-12-14 | End: 2022-12-14

## 2022-12-14 RX ADMIN — LIDOCAINE HYDROCHLORIDE 15 ML: 20 SOLUTION ORAL; TOPICAL at 09:12

## 2022-12-14 RX ADMIN — ALUMINUM HYDROXIDE, MAGNESIUM HYDROXIDE, AND SIMETHICONE 30 ML: 200; 200; 20 SUSPENSION ORAL at 09:12

## 2022-12-14 RX ADMIN — ALUMINUM HYDROXIDE, MAGNESIUM HYDROXIDE, AND SIMETHICONE 30 ML: 200; 200; 20 SUSPENSION ORAL at 07:12

## 2022-12-14 RX ADMIN — LIDOCAINE HYDROCHLORIDE 15 ML: 20 SOLUTION ORAL; TOPICAL at 07:12

## 2022-12-15 NOTE — ED PROVIDER NOTES
"Encounter Date: 12/14/2022       History     Chief Complaint   Patient presents with    Abdominal Pain     Dx w flu this week- pt reports still having abd pain - vomited x1 today - denies fever/ last bm today - denies diarrhea     Patient is a 42-year-old  male no significant past medical history presented to the ER today with a 6 day history of epigastric abdominal pain.  Patient was seen here in this ER 3 days ago at a CT scan performed which showed no remarkable findings.  Patient was diagnosed with flu in his symptoms improved with GI cocktail which patient reaffirms today on history.  Patient states the pain is mainly in epigastric region and can not identify any exacerbating or alleviating factors.  Patient states GI cocktail did improve the pain.  Patient been taking Levsin with no relief.  Patient states he had an "inflamed stomach in the past due to too much BC powder. "Patient states he is not used this anti-inflammatory since that time.  Patient states last bowel movement was this morning normal brown in color with no hematochezia melena.  Patient denies any dysuria hematuria.  Patient denies any chest pain, shortness of breath.    Review of patient's allergies indicates:  No Known Allergies  Past Medical History:   Diagnosis Date    HTN (hypertension)     Obesity, unspecified      No past surgical history on file.  Family History   Problem Relation Age of Onset    Hypertension Mother     Diabetes Mother     Diabetes Brother      Social History     Tobacco Use    Smoking status: Every Day     Packs/day: 0.50     Types: Cigarettes    Smokeless tobacco: Never   Substance Use Topics    Alcohol use: Not Currently    Drug use: Not Currently     Types: Marijuana     Review of Systems   Constitutional:  Negative for chills, fatigue and fever.   HENT:  Negative for congestion, sore throat and trouble swallowing.    Eyes:  Negative for pain and visual disturbance.   Respiratory:  Negative for " cough, shortness of breath and wheezing.    Cardiovascular:  Negative for chest pain and palpitations.   Gastrointestinal:  Positive for abdominal pain. Negative for blood in stool, constipation, diarrhea, nausea and vomiting.   Genitourinary:  Negative for dysuria and hematuria.   Musculoskeletal:  Negative for back pain and myalgias.   Skin:  Negative for rash and wound.   Neurological:  Negative for seizures, syncope and headaches.   Psychiatric/Behavioral:  Negative for confusion. The patient is not nervous/anxious.      Physical Exam     Initial Vitals [12/14/22 1922]   BP Pulse Resp Temp SpO2   (!) 144/106 62 20 99.7 °F (37.6 °C) 100 %      MAP       --         Physical Exam    Nursing note and vitals reviewed.  Constitutional: He appears well-developed and well-nourished. No distress.   HENT:   Head: Normocephalic and atraumatic.   Eyes: Conjunctivae and EOM are normal. Right eye exhibits no discharge. Left eye exhibits no discharge. No scleral icterus.   Neck: No tracheal deviation present.   Normal range of motion.  Cardiovascular:  Normal rate, regular rhythm, normal heart sounds and intact distal pulses.     Exam reveals no gallop and no friction rub.       No murmur heard.  Pulmonary/Chest: Breath sounds normal. No respiratory distress. He has no wheezes. He has no rhonchi. He has no rales.   Abdominal: Abdomen is soft. He exhibits no distension. There is abdominal tenderness.   Negative Nunez sign.  No pain at McBurney's point.  Abdomen is soft.  No signs of acute surgical abdomen.  Negative Raul's sign negative Lerma Hall sign.  There is tenderness palpation with no guarding or rebound tenderness in the epigastric region.  No hernias appreciated on exam. There is no guarding.   Musculoskeletal:         General: No tenderness or edema. Normal range of motion.      Cervical back: Normal range of motion.     Neurological: He is alert. He has normal strength. No cranial nerve deficit.   Skin: Skin is  warm and dry. No rash and no abscess noted. No erythema. No pallor.   Psychiatric: His behavior is normal. Judgment normal.       ED Course   Procedures  Labs Reviewed   COMPREHENSIVE METABOLIC PANEL - Abnormal; Notable for the following components:       Result Value    Carbon Dioxide 20 (*)     All other components within normal limits   URINALYSIS, REFLEX TO URINE CULTURE - Abnormal; Notable for the following components:    Ketones, UA 40 (*)     Urobilinogen, UA 2.0 (*)     All other components within normal limits   LIPASE - Abnormal; Notable for the following components:    Lipase Level 117 (*)     All other components within normal limits   CBC WITH DIFFERENTIAL - Abnormal; Notable for the following components:    MCHC 32.6 (*)     All other components within normal limits   DRUG SCREEN, URINE (BEAKER) - Abnormal; Notable for the following components:    Cannabinoids, Urine Positive (*)     All other components within normal limits    Narrative:     Cut off concentrations:    Amphetamines - 1000 ng/ml  Barbiturates - 200 ng/ml  Benzodiazepine - 200 ng/ml  Cannabinoids (THC) - 50 ng/ml  Cocaine - 300 ng/ml  Fentanyl - 1.0 ng/ml  MDMA - 500 ng/ml  Opiates - 300 ng/ml   Phencyclidine (PCP) - 25 ng/ml    Specimen submitted for drug analysis and tested for pH and specific gravity in order to evaluate sample integrity. Suspect tampering if specific gravity is <1.003 and/or pH is not within the range of 4.5 - 8.0  False negatives may result form substances such as bleach added to urine.  False positives may result for the presence of a substance with similar chemical structure to the drug or its metabolite.    This test provides only a PRELIMINARY analytical test result. A more specific alternate chemical method must be used in order to obtain a confirmed analytical result. Gas chromatography/mass spectrometry (GC/MS) is the preferred confirmatory method. Other chemical confirmation methods are available. Clinical  consideration and professional judgement should be applied to any drug of abuse test result, particularly when preliminary positive results are used.    Positive results will be confirmed only at the physicians request. Unconfirmed screening results are to be used only for medical purposes (treatment).        URINALYSIS, MICROSCOPIC - Normal   CBC W/ AUTO DIFFERENTIAL    Narrative:     The following orders were created for panel order CBC Auto Differential.  Procedure                               Abnormality         Status                     ---------                               -----------         ------                     CBC with Differential[[id number]]        Abnormal            Final result                 Please view results for these tests on the individual orders.          Imaging Results    None          Medications   aluminum-magnesium hydroxide-simethicone 200-200-20 mg/5 mL suspension 30 mL (30 mLs Oral Given 12/14/22 1950)     And   LIDOcaine HCl 2% oral solution 15 mL (15 mLs Oral Given 12/14/22 1950)   aluminum-magnesium hydroxide-simethicone 200-200-20 mg/5 mL suspension 30 mL (30 mLs Oral Given 12/14/22 2145)     And   LIDOcaine HCl 2% oral solution 15 mL (15 mLs Oral Given 12/14/22 2145)     Medical Decision Making:   Initial Assessment:   Overall well-appearing 42-year-old male  Clinical Tests:   Lab Tests: Ordered and Reviewed  ED Management:  Vital signs stable patient is afebrile   CT scan was performed 3 days ago with no acute intra-abdominal etiology   Denies chest pain, shortness of breath, nausea, vomiting, diaphoresis   No cardiac history   Tenderness in the epigastric region   Worse with p.o. intake   GI cocktail provided complete relief last time   GI cocktail given today with complete relief again   Not have the ability to do CT scan today as CT scanner is down   Since CT scan was done 3 days ago and has no signs of acute surgical abdomen I do not feel that it is indicated today    Basic labs unremarkable  Urine studies only significant for cannabinoids   I do strongly feel gastritis plays a part given this patient has long history of this   Advised patient Carafate, outpatient H pylori testing, GERD diet, PPI Tums going forward  Return precautions discussed and follow up with PCP is recommended                        Clinical Impression:   Final diagnoses:  [K29.70] Gastritis, presence of bleeding unspecified, unspecified chronicity, unspecified gastritis type (Primary)        ED Disposition Condition    Discharge Stable          ED Prescriptions       Medication Sig Dispense Start Date End Date Auth. Provider    sucralfate (CARAFATE) 1 gram tablet Take 1 tablet (1 g total) by mouth 4 (four) times daily before meals and nightly. 15 tablet 12/14/2022 -- Pascual Preciado MD    pantoprazole (PROTONIX) 40 MG tablet Take 1 tablet (40 mg total) by mouth once daily. 30 tablet 12/14/2022 12/14/2023 Pascual Preciado MD          Follow-up Information       Follow up With Specialties Details Why Contact Info    [facility]  If symptoms worsen [address] [facility] [address] [phone]    [facility] Schedule an appointment as soon as possible for a visit   [address] [facility] [address] [phone]               Pascual Preciado MD  12/14/22 2110

## 2023-01-10 ENCOUNTER — LAB VISIT (OUTPATIENT)
Dept: LAB | Facility: HOSPITAL | Age: 43
End: 2023-01-10
Attending: NURSE PRACTITIONER
Payer: MEDICAID

## 2023-01-10 DIAGNOSIS — E78.00 HYPERCHOLESTEROLEMIA: ICD-10-CM

## 2023-01-10 LAB
ALBUMIN SERPL-MCNC: 3.7 G/DL (ref 3.5–5)
ALBUMIN/GLOB SERPL: 1.4 RATIO (ref 1.1–2)
ALP SERPL-CCNC: 94 UNIT/L (ref 40–150)
ALT SERPL-CCNC: 13 UNIT/L (ref 0–55)
AST SERPL-CCNC: 15 UNIT/L (ref 5–34)
BILIRUBIN DIRECT+TOT PNL SERPL-MCNC: 0.6 MG/DL
BUN SERPL-MCNC: 10.7 MG/DL (ref 8.9–20.6)
CALCIUM SERPL-MCNC: 9.1 MG/DL (ref 8.4–10.2)
CHLORIDE SERPL-SCNC: 107 MMOL/L (ref 98–107)
CHOLEST SERPL-MCNC: 182 MG/DL
CHOLEST/HDLC SERPL: 6 {RATIO} (ref 0–5)
CO2 SERPL-SCNC: 27 MMOL/L (ref 22–29)
CREAT SERPL-MCNC: 0.93 MG/DL (ref 0.73–1.18)
GFR SERPLBLD CREATININE-BSD FMLA CKD-EPI: >60 MLS/MIN/1.73/M2
GLOBULIN SER-MCNC: 2.7 GM/DL (ref 2.4–3.5)
GLUCOSE SERPL-MCNC: 93 MG/DL (ref 74–100)
HDLC SERPL-MCNC: 31 MG/DL (ref 35–60)
LDLC SERPL CALC-MCNC: 125 MG/DL (ref 50–140)
POTASSIUM SERPL-SCNC: 4 MMOL/L (ref 3.5–5.1)
PROT SERPL-MCNC: 6.4 GM/DL (ref 6.4–8.3)
SODIUM SERPL-SCNC: 143 MMOL/L (ref 136–145)
TRIGL SERPL-MCNC: 129 MG/DL (ref 34–140)
VLDLC SERPL CALC-MCNC: 26 MG/DL

## 2023-01-10 PROCEDURE — 36415 COLL VENOUS BLD VENIPUNCTURE: CPT

## 2023-01-10 PROCEDURE — 80053 COMPREHEN METABOLIC PANEL: CPT

## 2023-01-10 PROCEDURE — 80061 LIPID PANEL: CPT

## 2023-01-11 ENCOUNTER — OFFICE VISIT (OUTPATIENT)
Dept: FAMILY MEDICINE | Facility: CLINIC | Age: 43
End: 2023-01-11
Payer: MEDICAID

## 2023-01-11 VITALS
HEART RATE: 78 BPM | HEIGHT: 67 IN | RESPIRATION RATE: 16 BRPM | SYSTOLIC BLOOD PRESSURE: 140 MMHG | OXYGEN SATURATION: 100 % | DIASTOLIC BLOOD PRESSURE: 79 MMHG | WEIGHT: 215.13 LBS | BODY MASS INDEX: 33.76 KG/M2

## 2023-01-11 DIAGNOSIS — E78.00 HYPERCHOLESTEROLEMIA: Primary | ICD-10-CM

## 2023-01-11 DIAGNOSIS — Z72.0 TOBACCO USE: ICD-10-CM

## 2023-01-11 PROCEDURE — 3008F BODY MASS INDEX DOCD: CPT | Mod: CPTII,,, | Performed by: NURSE PRACTITIONER

## 2023-01-11 PROCEDURE — 1159F PR MEDICATION LIST DOCUMENTED IN MEDICAL RECORD: ICD-10-PCS | Mod: CPTII,,, | Performed by: NURSE PRACTITIONER

## 2023-01-11 PROCEDURE — 3077F PR MOST RECENT SYSTOLIC BLOOD PRESSURE >= 140 MM HG: ICD-10-PCS | Mod: CPTII,,, | Performed by: NURSE PRACTITIONER

## 2023-01-11 PROCEDURE — 99213 OFFICE O/P EST LOW 20 MIN: CPT | Mod: ,,, | Performed by: NURSE PRACTITIONER

## 2023-01-11 PROCEDURE — 3078F DIAST BP <80 MM HG: CPT | Mod: CPTII,,, | Performed by: NURSE PRACTITIONER

## 2023-01-11 PROCEDURE — 3078F PR MOST RECENT DIASTOLIC BLOOD PRESSURE < 80 MM HG: ICD-10-PCS | Mod: CPTII,,, | Performed by: NURSE PRACTITIONER

## 2023-01-11 PROCEDURE — 3008F PR BODY MASS INDEX (BMI) DOCUMENTED: ICD-10-PCS | Mod: CPTII,,, | Performed by: NURSE PRACTITIONER

## 2023-01-11 PROCEDURE — 3077F SYST BP >= 140 MM HG: CPT | Mod: CPTII,,, | Performed by: NURSE PRACTITIONER

## 2023-01-11 PROCEDURE — 1159F MED LIST DOCD IN RCRD: CPT | Mod: CPTII,,, | Performed by: NURSE PRACTITIONER

## 2023-01-11 PROCEDURE — 99213 PR OFFICE/OUTPT VISIT, EST, LEVL III, 20-29 MIN: ICD-10-PCS | Mod: ,,, | Performed by: NURSE PRACTITIONER

## 2023-01-11 RX ORDER — TRAMADOL HYDROCHLORIDE 50 MG/1
50 TABLET ORAL EVERY 8 HOURS PRN
Qty: 12 TABLET | Refills: 0 | Status: SHIPPED | OUTPATIENT
Start: 2023-01-11 | End: 2023-04-17

## 2023-01-11 NOTE — PROGRESS NOTES
SUBJECTIVE:     History of Present Illness      Chief Complaint: Follow-up (Follow up visit to discuss lab results.  No complaints at this time.)    HPI:  Patient is a 42 y.o. year old male who presents to clinic for three-month follow-up elevated cholesterol.  She states that since last visit he has not started taking cholesterol medication.  Patient reported diet and exercise  The patient is also complaining of back and buttock pain.  States that he fail removal week ago when he bruised his butt .  Review of Systems:  General: Denies fever, chills, fatigue, myalgias, and change in appetite   Eyes: Denies change in vision, eye redness, eye drainage, eye pain  ENT: Denies ear pain or pressure, rhinorrhea, nasal congestion, sore throat, and trouble swallowing  Resp: Denies wheezing, and shortness of breath   Cardio: Denies chest pain, palpitations, pleuritic pain, and edema   GI: Denies nausea, vomiting, diarrhea, and abdominal pain   : Denies dysuria, hematuria, and discharge   MSK: Denies trauma, joint pain, and trouble ambulating   Neuro: Denies LOC, dizziness, seizure like activity, and focal deficits   Skin: Denies rashes, open lesions and ulcers      Previous History      Review of patient's allergies indicates:  No Known Allergies    Past Medical History:   Diagnosis Date    HTN (hypertension)     Hyperlipidemia     Obesity, unspecified      Current Outpatient Medications   Medication Instructions    atorvastatin (LIPITOR) 10 mg, Oral, Daily    buPROPion (WELLBUTRIN XL) 150 mg, Oral, Daily    hydroCHLOROthiazide (HYDRODIURIL) 12.5 mg, Oral, Daily    hyoscyamine (ANASPAZ,LEVSIN) 0.125 mg Tab TAKE 1 TABLET(125 MCG) BY MOUTH EVERY 4 HOURS AS NEEDED    ondansetron (ZOFRAN) 4 mg, Oral, Every 6 hours     History reviewed. No pertinent surgical history.  Family History   Problem Relation Age of Onset    Hypertension Mother     Diabetes Mother     Diabetes Brother        Social History     Tobacco Use    Smoking  "status: Every Day     Packs/day: 0.50     Types: Cigarettes    Smokeless tobacco: Never   Substance Use Topics    Alcohol use: Not Currently    Drug use: Not Currently     Types: Marijuana        Health Maintenance      Health Maintenance   Topic Date Due    Hepatitis C Screening  Never done    TETANUS VACCINE  Never done    Lipid Panel  01/10/2028       OBJECTIVE:     Physical Exam      Vital Signs Reviewed   BP (!) 140/79   Pulse 78   Resp 16   Ht 5' 7" (1.702 m)   Wt 97.6 kg (215 lb 1.6 oz)   SpO2 100%   BMI 33.69 kg/m²     Physical Exam:  General: Alert, well nourished, no acute distress, non-toxic appearing.   Eyes: Anicteric sclera, without conjunctival injection, normal lids, no purulent drainage, EOMs grossly intact.   Ears: No tragal tenderness. Tympanic membranes intact, pearly grey, without effusion or erythema and with a positive light reflex.   Mouth: Posterior pharynx without erythema. No exudate, ulcerations, or lesion. No tonsillar swelling.   Neck: Supple, full ROM, no rigidity, no cervical adenopathy.   Cardio: Normal rate and rhythm    Resp: Respirations even and unlabored, clear to auscultation bilaterally.   Abd: No ecchymosis or distension. Normal bowel sounds in all 4 quadrants. No tenderness to palpation. No rebound tenderness or guarding. No CVA tenderness.   Skin: No rashes or open lesions noted.   MSK: No swelling. No abrasions or signs of trauma. Ambulating without assistance.   Neuro: Alert,oriented No focal deficits noted. Facial expressions even.   Psych: Cooperative, Normal affect      Procedures    Procedures     Labs     Results for orders placed or performed in visit on 01/10/23   Lipid Panel   Result Value Ref Range    Cholesterol Total 182 <=200 mg/dL    HDL Cholesterol 31 (L) 35 - 60 mg/dL    Triglyceride 129 34 - 140 mg/dL    Cholesterol/HDL Ratio 6 (H) 0 - 5    Very Low Density Lipoprotein 26     LDL Cholesterol 125.00 50.00 - 140.00 mg/dL   Comprehensive Metabolic Panel "   Result Value Ref Range    Sodium Level 143 136 - 145 mmol/L    Potassium Level 4.0 3.5 - 5.1 mmol/L    Chloride 107 98 - 107 mmol/L    Carbon Dioxide 27 22 - 29 mmol/L    Glucose Level 93 74 - 100 mg/dL    Blood Urea Nitrogen 10.7 8.9 - 20.6 mg/dL    Creatinine 0.93 0.73 - 1.18 mg/dL    Calcium Level Total 9.1 8.4 - 10.2 mg/dL    Protein Total 6.4 6.4 - 8.3 gm/dL    Albumin Level 3.7 3.5 - 5.0 g/dL    Globulin 2.7 2.4 - 3.5 gm/dL    Albumin/Globulin Ratio 1.4 1.1 - 2.0 ratio    Bilirubin Total 0.6 <=1.5 mg/dL    Alkaline Phosphatase 94 40 - 150 unit/L    Alanine Aminotransferase 13 0 - 55 unit/L    Aspartate Aminotransferase 15 5 - 34 unit/L    eGFR >60 mls/min/1.73/m2        Assessment       1. Hypercholesterolemia    2. Tobacco use           Plan         1. Hypercholesterolemia  Removed without medication patient states that he does not want to take statin as recommended per as this  Low fat, low cholesterol diet .  Increase dietary fiber  Avoid fried, fatty , high saturated fats.  Add flaxseed or fish oil omega supplement to diet .   exercise to reduce LDL and raise HDL. Exercise at least 30 mins a day as tolerated     2. Tobacco use  Encouraged smoking cessation  Medication List with Changes/Refills   Current Medications    ATORVASTATIN (LIPITOR) 10 MG TABLET    Take 1 tablet (10 mg total) by mouth once daily.    BUPROPION (WELLBUTRIN XL) 150 MG TB24 TABLET    Take 1 tablet (150 mg total) by mouth once daily.    HYDROCHLOROTHIAZIDE (HYDRODIURIL) 12.5 MG TAB    Take 1 tablet (12.5 mg total) by mouth once daily.    HYOSCYAMINE (ANASPAZ,LEVSIN) 0.125 MG TAB    TAKE 1 TABLET(125 MCG) BY MOUTH EVERY 4 HOURS AS NEEDED    ONDANSETRON (ZOFRAN) 4 MG TABLET    Take 1 tablet (4 mg total) by mouth every 6 (six) hours.   Discontinued Medications    PANTOPRAZOLE (PROTONIX) 40 MG TABLET    Take 1 tablet (40 mg total) by mouth once daily.    SUCRALFATE (CARAFATE) 1 GRAM TABLET    Take 1 tablet (1 g total) by mouth 4 (four)  times daily before meals and nightly.           Future Appointments   Date Time Provider Department Center   7/11/2023  9:20 AM [facility] Sepulved[facility]  [facility]

## 2023-01-19 RX ORDER — MELOXICAM 15 MG/1
15 TABLET ORAL DAILY
Qty: 30 TABLET | Refills: 0 | Status: SHIPPED | OUTPATIENT
Start: 2023-01-19 | End: 2023-10-05 | Stop reason: SDUPTHER

## 2023-01-20 ENCOUNTER — TELEPHONE (OUTPATIENT)
Dept: FAMILY MEDICINE | Facility: CLINIC | Age: 43
End: 2023-01-20
Payer: MEDICAID

## 2023-01-20 NOTE — TELEPHONE ENCOUNTER
"----- Message from RENETTA Sepulveda sent at 1/19/2023  4:56 PM CST -----  Regarding: RE: med adv  Please inform pt Rx sent mobic    Alternate heat and cold compress for pain swelling   ----- Message -----  From: Randi Earl LPN  Sent: 1/19/2023   3:04 PM CST  To: RENETTA Sepulveda  Subject: FW: med adv                                      Patient states he saw you about a week ago and had a "bump" on his buttocks, it is still there.  Please advise  ----- Message -----  From: Chloe Chin  Sent: 1/19/2023   2:53 PM CST  To: Joceline Viramontes Staff  Subject: med adv                                          Type:  Needs Medical Advice    Who Called: Helena Reed  Symptoms (please be specific): pain and a lump on the buttocks.   How long has patient had these symptoms:  17 days ago  Would the patient rather a call back or a response via [facility]?   Best Call Back Number: [phone]  Additional Information: patient recently fell and now has pain and a lump on his buttocks Please call patient and advise.           "

## 2023-01-20 NOTE — TELEPHONE ENCOUNTER
Spoke with MAIN Malik, informed rx sent in and alternate heat and cold compress.  Voices understanding and agrees.

## 2023-01-23 ENCOUNTER — TELEPHONE (OUTPATIENT)
Dept: FAMILY MEDICINE | Facility: CLINIC | Age: 43
End: 2023-01-23
Payer: MEDICAID

## 2023-01-23 NOTE — TELEPHONE ENCOUNTER
----- Message from Kaylee Solano sent at 1/23/2023 10:10 AM CST -----  Regarding: Med Advice  .Type:  Needs Medical Advice    Who Called: pt's girlfriend  Symptoms (please be specific): wound care   How long has patient had these symptoms:  3days  Would the patient rather a call back or a response via [facility]? Call back   Best Call Back Number: [phone]  Additional Information: pt's girlfriend-Helena stated was instructed to use warm and cold compass to the buttocks and she stated 3days ago it formed to an blister on his buttocks, please advise

## 2023-01-23 NOTE — TELEPHONE ENCOUNTER
Answered patients questions re ice compresses.  Explainted 15 minutes on 15 minutes off.  Takes medication as rx  agrees. Will call back wed if no better

## 2023-01-27 ENCOUNTER — TELEPHONE (OUTPATIENT)
Dept: FAMILY MEDICINE | Facility: CLINIC | Age: 43
End: 2023-01-27
Payer: MEDICAID

## 2023-04-17 ENCOUNTER — OFFICE VISIT (OUTPATIENT)
Dept: FAMILY MEDICINE | Facility: CLINIC | Age: 43
End: 2023-04-17
Payer: MEDICAID

## 2023-04-17 VITALS
HEART RATE: 86 BPM | HEIGHT: 67 IN | DIASTOLIC BLOOD PRESSURE: 102 MMHG | WEIGHT: 209 LBS | SYSTOLIC BLOOD PRESSURE: 160 MMHG | OXYGEN SATURATION: 100 % | RESPIRATION RATE: 18 BRPM | BODY MASS INDEX: 32.8 KG/M2

## 2023-04-17 DIAGNOSIS — I10 HYPERTENSION, UNSPECIFIED TYPE: Primary | ICD-10-CM

## 2023-04-17 DIAGNOSIS — M54.50 LUMBAR PAIN: ICD-10-CM

## 2023-04-17 PROCEDURE — 99213 PR OFFICE/OUTPT VISIT, EST, LEVL III, 20-29 MIN: ICD-10-PCS | Mod: ,,, | Performed by: NURSE PRACTITIONER

## 2023-04-17 PROCEDURE — 3080F DIAST BP >= 90 MM HG: CPT | Mod: CPTII,,, | Performed by: NURSE PRACTITIONER

## 2023-04-17 PROCEDURE — 3008F BODY MASS INDEX DOCD: CPT | Mod: CPTII,,, | Performed by: NURSE PRACTITIONER

## 2023-04-17 PROCEDURE — 3077F SYST BP >= 140 MM HG: CPT | Mod: CPTII,,, | Performed by: NURSE PRACTITIONER

## 2023-04-17 PROCEDURE — 3008F PR BODY MASS INDEX (BMI) DOCUMENTED: ICD-10-PCS | Mod: CPTII,,, | Performed by: NURSE PRACTITIONER

## 2023-04-17 PROCEDURE — 1159F PR MEDICATION LIST DOCUMENTED IN MEDICAL RECORD: ICD-10-PCS | Mod: CPTII,,, | Performed by: NURSE PRACTITIONER

## 2023-04-17 PROCEDURE — 1159F MED LIST DOCD IN RCRD: CPT | Mod: CPTII,,, | Performed by: NURSE PRACTITIONER

## 2023-04-17 PROCEDURE — 99213 OFFICE O/P EST LOW 20 MIN: CPT | Mod: ,,, | Performed by: NURSE PRACTITIONER

## 2023-04-17 PROCEDURE — 3077F PR MOST RECENT SYSTOLIC BLOOD PRESSURE >= 140 MM HG: ICD-10-PCS | Mod: CPTII,,, | Performed by: NURSE PRACTITIONER

## 2023-04-17 PROCEDURE — 3080F PR MOST RECENT DIASTOLIC BLOOD PRESSURE >= 90 MM HG: ICD-10-PCS | Mod: CPTII,,, | Performed by: NURSE PRACTITIONER

## 2023-04-17 RX ORDER — METHOCARBAMOL 500 MG/1
500 TABLET, FILM COATED ORAL 4 TIMES DAILY
Qty: 40 TABLET | Refills: 0 | Status: SHIPPED | OUTPATIENT
Start: 2023-04-17 | End: 2023-04-27

## 2023-04-17 NOTE — PROGRESS NOTES
SUBJECTIVE:     History of Present Illness      Chief Complaint: Follow-up (Pt  had a fall in past. Remains with pain to left hip/ buttocks. Pt reports a healed wound to the above area. )    HPI:  Patient is a 42 y.o. year old male who presents to clinic for follow-up patient does have past medical history of hypertension.  He reports to being noncompliant with hypertension medication.  States that he has been out seamoss supplement  which he usually takes  for his blood pressure.  Denies Headaches, dizziness or blurred vision, chest pain or shortness a breath    Review of Systems:    Review of Systems    12 point review of systems conducted, negative except as stated in the history of present illness. See HPI for details.     Previous History      Review of patient's allergies indicates:  No Known Allergies    Past Medical History:   Diagnosis Date    HTN (hypertension)     Hyperlipidemia     Obesity, unspecified      Current Outpatient Medications   Medication Instructions    atorvastatin (LIPITOR) 10 mg, Oral, Daily    hydroCHLOROthiazide (HYDRODIURIL) 12.5 mg, Oral, Daily    hyoscyamine (ANASPAZ,LEVSIN) 0.125 mg Tab TAKE 1 TABLET(125 MCG) BY MOUTH EVERY 4 HOURS AS NEEDED    meloxicam (MOBIC) 15 mg, Oral, Daily    methocarbamoL (ROBAXIN) 500 mg, Oral, 4 times daily    ondansetron (ZOFRAN) 4 mg, Oral, Every 6 hours     History reviewed. No pertinent surgical history.  Family History   Problem Relation Age of Onset    Hypertension Mother     Diabetes Mother     Diabetes Brother        Social History     Tobacco Use    Smoking status: Every Day     Packs/day: 1.00     Types: Cigarettes    Smokeless tobacco: Never   Substance Use Topics    Alcohol use: Not Currently    Drug use: Yes     Types: Marijuana        Health Maintenance      Health Maintenance   Topic Date Due    Hepatitis C Screening  Never done    TETANUS VACCINE  Never done    Lipid Panel  01/10/2028       OBJECTIVE:     Physical Exam      Vital Signs  "Reviewed   Visit Vitals  BP (!) 160/102   Pulse 86   Resp 18   Ht 5' 7" (1.702 m)   Wt 94.8 kg (209 lb)   SpO2 100%   BMI 32.73 kg/m²       Physical Exam    Physical Exam:  General: Alert, well nourished, no acute distress, non-toxic appearing.   Eyes: Anicteric sclera, without conjunctival injection, normal lids, no purulent drainage, EOMs grossly intact.   Ears: No tragal tenderness. Tympanic membranes intact, pearly grey, without effusion or erythema and with a positive light reflex.   Mouth: Posterior pharynx without erythema. No exudate, ulcerations, or lesion. No tonsillar swelling.   Neck: Supple, full ROM, no rigidity, no cervical adenopathy.   Cardio: Normal rate and rhythm    Resp: Respirations even and unlabored, clear to auscultation bilaterally.   Abd: No ecchymosis or distension. Normal bowel sounds in all 4 quadrants. No tenderness to palpation. No rebound tenderness or guarding. No CVA tenderness.   Skin: No rashes or open lesions noted.   MSK: No swelling. No abrasions or signs of trauma. Ambulating without assistance.   Neuro: Alert,oriented No focal deficits noted. Facial expressions even.   Psych: Cooperative, Normal affect      Procedures    Procedures     Labs     Results for orders placed or performed in visit on 01/10/23   Lipid Panel   Result Value Ref Range    Cholesterol Total 182 <=200 mg/dL    HDL Cholesterol 31 (L) 35 - 60 mg/dL    Triglyceride 129 34 - 140 mg/dL    Cholesterol/HDL Ratio 6 (H) 0 - 5    Very Low Density Lipoprotein 26     LDL Cholesterol 125.00 50.00 - 140.00 mg/dL   Comprehensive Metabolic Panel   Result Value Ref Range    Sodium Level 143 136 - 145 mmol/L    Potassium Level 4.0 3.5 - 5.1 mmol/L    Chloride 107 98 - 107 mmol/L    Carbon Dioxide 27 22 - 29 mmol/L    Glucose Level 93 74 - 100 mg/dL    Blood Urea Nitrogen 10.7 8.9 - 20.6 mg/dL    Creatinine 0.93 0.73 - 1.18 mg/dL    Calcium Level Total 9.1 8.4 - 10.2 mg/dL    Protein Total 6.4 6.4 - 8.3 gm/dL    Albumin " Level 3.7 3.5 - 5.0 g/dL    Globulin 2.7 2.4 - 3.5 gm/dL    Albumin/Globulin Ratio 1.4 1.1 - 2.0 ratio    Bilirubin Total 0.6 <=1.5 mg/dL    Alkaline Phosphatase 94 40 - 150 unit/L    Alanine Aminotransferase 13 0 - 55 unit/L    Aspartate Aminotransferase 15 5 - 34 unit/L    eGFR >60 mls/min/1.73/m2       Chemistry:  Lab Results   Component Value Date     01/10/2023    K 4.0 01/10/2023    CHLORIDE 107 01/10/2023    BUN 10.7 01/10/2023    CREATININE 0.93 01/10/2023    EGFRNORACEVR >60 01/10/2023    GLUCOSE 93 01/10/2023    CALCIUM 9.1 01/10/2023    ALKPHOS 94 01/10/2023    LABPROT 6.4 01/10/2023    ALBUMIN 3.7 01/10/2023    BILIDIR 0.10 12/02/2017    IBILI 0.31 12/02/2017    AST 15 01/10/2023    ALT 13 01/10/2023    [id number] 21.6 (L) 05/20/2022    TSH 2.4280 05/20/2022    PSA 1.45 05/20/2022        Lab Results   Component Value Date    HGBA1C 5.4 05/20/2022        Hematology:  Lab Results   Component Value Date    WBC 11.2 12/14/2022    HGB 14.3 12/14/2022    HCT 43.9 12/14/2022     12/14/2022       Lipid Panel:  Lab Results   Component Value Date    CHOL 182 01/10/2023    HDL 31 (L) 01/10/2023    .00 01/10/2023    TRIG 129 01/10/2023    TOTALCHOLEST 6 (H) 01/10/2023        Urine:  Lab Results   Component Value Date    COLORUA Yellow 12/14/2022    APPEARANCEUA Clear 12/14/2022    SGUA 1.020 12/14/2022    PHUA 8.5 12/14/2022    PROTEINUA Negative 12/14/2022    GLUCOSEUA Negative 12/14/2022    KETONESUA 40 (A) 12/14/2022    BLOODUA Negative 12/14/2022    NITRITESUA Negative 12/14/2022    LEUKOCYTESUR Negative 12/14/2022    RBCUA None Seen 12/14/2022    WBCUA None Seen 12/14/2022    BACTERIA None Seen 12/14/2022         Assessment       1. Hypertension, unspecified type    2. Lumbar pain           Plan       1. Hypertension, unspecified type  .   Continue current medication as prescribed   Low salt/ DASH diet   Discussed lifestyle modifications.   encourage aerobic excise at least 30 mins a day    Monitor BP daily goal less than 140/90.   Denies headaches, blurred vision, or dizziness    2. Lumbar pain  - methocarbamoL (ROBAXIN) 500 MG Tab; Take 1 tablet (500 mg total) by mouth 4 (four) times daily. for 10 days  Dispense: 40 tablet; Refill: 0    Orders Placed This Encounter    methocarbamoL (ROBAXIN) 500 MG Tab      Medication List with Changes/Refills   New Medications    METHOCARBAMOL (ROBAXIN) 500 MG TAB    Take 1 tablet (500 mg total) by mouth 4 (four) times daily. for 10 days   Current Medications    ATORVASTATIN (LIPITOR) 10 MG TABLET    Take 1 tablet (10 mg total) by mouth once daily.    HYDROCHLOROTHIAZIDE (HYDRODIURIL) 12.5 MG TAB    Take 1 tablet (12.5 mg total) by mouth once daily.    HYOSCYAMINE (ANASPAZ,LEVSIN) 0.125 MG TAB    TAKE 1 TABLET(125 MCG) BY MOUTH EVERY 4 HOURS AS NEEDED    MELOXICAM (MOBIC) 15 MG TABLET    Take 1 tablet (15 mg total) by mouth once daily.    ONDANSETRON (ZOFRAN) 4 MG TABLET    Take 1 tablet (4 mg total) by mouth every 6 (six) hours.   Discontinued Medications    BUPROPION (WELLBUTRIN XL) 150 MG TB24 TABLET    Take 1 tablet (150 mg total) by mouth once daily.    TRAMADOL (ULTRAM) 50 MG TABLET    Take 1 tablet (50 mg total) by mouth every 8 (eight) hours as needed for Pain.       Follow up in about 2 weeks (around 5/1/2023) for Nurse Visit.     Future Appointments   Date Time Provider Department Center   7/11/2023  9:20 AM RENETTA Sepulveda [facility]

## 2023-07-05 ENCOUNTER — TELEPHONE (OUTPATIENT)
Dept: FAMILY MEDICINE | Facility: CLINIC | Age: 43
End: 2023-07-05
Payer: MEDICAID

## 2023-07-05 DIAGNOSIS — Z00.00 WELLNESS EXAMINATION: Primary | ICD-10-CM

## 2023-08-28 ENCOUNTER — HOSPITAL ENCOUNTER (OUTPATIENT)
Dept: RADIOLOGY | Facility: HOSPITAL | Age: 43
Discharge: HOME | End: 2023-08-28
Attending: NURSE PRACTITIONER
Payer: MEDICAID

## 2023-08-28 ENCOUNTER — OFFICE VISIT (OUTPATIENT)
Dept: FAMILY MEDICINE | Facility: CLINIC | Age: 43
End: 2023-08-28
Payer: MEDICAID

## 2023-08-28 VITALS
OXYGEN SATURATION: 100 % | HEART RATE: 77 BPM | TEMPERATURE: 98 F | BODY MASS INDEX: 33.27 KG/M2 | DIASTOLIC BLOOD PRESSURE: 99 MMHG | WEIGHT: 212.38 LBS | SYSTOLIC BLOOD PRESSURE: 164 MMHG

## 2023-08-28 DIAGNOSIS — M54.2 NECK PAIN: ICD-10-CM

## 2023-08-28 DIAGNOSIS — M54.2 NECK PAIN: Primary | ICD-10-CM

## 2023-08-28 DIAGNOSIS — I10 HYPERTENSION, UNSPECIFIED TYPE: ICD-10-CM

## 2023-08-28 DIAGNOSIS — M54.50 LUMBAR PAIN: ICD-10-CM

## 2023-08-28 PROCEDURE — 3080F DIAST BP >= 90 MM HG: CPT | Mod: CPTII,,, | Performed by: NURSE PRACTITIONER

## 2023-08-28 PROCEDURE — 72100 X-RAY EXAM L-S SPINE 2/3 VWS: CPT | Mod: TC

## 2023-08-28 PROCEDURE — 99213 PR OFFICE/OUTPT VISIT, EST, LEVL III, 20-29 MIN: ICD-10-PCS | Mod: ,,, | Performed by: NURSE PRACTITIONER

## 2023-08-28 PROCEDURE — 3077F PR MOST RECENT SYSTOLIC BLOOD PRESSURE >= 140 MM HG: ICD-10-PCS | Mod: CPTII,,, | Performed by: NURSE PRACTITIONER

## 2023-08-28 PROCEDURE — 99213 OFFICE O/P EST LOW 20 MIN: CPT | Mod: ,,, | Performed by: NURSE PRACTITIONER

## 2023-08-28 PROCEDURE — 1159F MED LIST DOCD IN RCRD: CPT | Mod: CPTII,,, | Performed by: NURSE PRACTITIONER

## 2023-08-28 PROCEDURE — 3080F PR MOST RECENT DIASTOLIC BLOOD PRESSURE >= 90 MM HG: ICD-10-PCS | Mod: CPTII,,, | Performed by: NURSE PRACTITIONER

## 2023-08-28 PROCEDURE — 3008F PR BODY MASS INDEX (BMI) DOCUMENTED: ICD-10-PCS | Mod: CPTII,,, | Performed by: NURSE PRACTITIONER

## 2023-08-28 PROCEDURE — 72040 X-RAY EXAM NECK SPINE 2-3 VW: CPT | Mod: TC

## 2023-08-28 PROCEDURE — 3008F BODY MASS INDEX DOCD: CPT | Mod: CPTII,,, | Performed by: NURSE PRACTITIONER

## 2023-08-28 PROCEDURE — 3077F SYST BP >= 140 MM HG: CPT | Mod: CPTII,,, | Performed by: NURSE PRACTITIONER

## 2023-08-28 PROCEDURE — 1159F PR MEDICATION LIST DOCUMENTED IN MEDICAL RECORD: ICD-10-PCS | Mod: CPTII,,, | Performed by: NURSE PRACTITIONER

## 2023-08-28 RX ORDER — IBUPROFEN 800 MG/1
800 TABLET ORAL 3 TIMES DAILY
Qty: 90 TABLET | Refills: 0 | Status: SHIPPED | OUTPATIENT
Start: 2023-08-28 | End: 2023-10-01 | Stop reason: SDUPTHER

## 2023-08-28 RX ORDER — HYDROCHLOROTHIAZIDE 12.5 MG/1
12.5 TABLET ORAL DAILY
Qty: 30 TABLET | Refills: 2 | Status: SHIPPED | OUTPATIENT
Start: 2023-08-28 | End: 2023-11-27 | Stop reason: SDUPTHER

## 2023-08-28 NOTE — PROGRESS NOTES
SUBJECTIVE:     History of Present Illness      Chief Complaint: Pain (Ear , teeth . Teeth pulled a month ago , sensitive to seasoning face swells . ), Otalgia (Have swelling and pain to where he can't hear. ), Facial Swelling (Face swelling on right side to where its causing pain to ear, face and teeth. ), Neck Pain (Back of neck is pressure. He relieves it by going to gym, gym helps it . ), and Back Pain (Lower back pain on left side.)    HPI:  Patient is a 43 y.o. year old male who presents to clinic for complaints of lower back pain and neck pain.  Initial episode was a few months ago when patient fell down the stairs.  Patient also complaining of facial swelling after having teeth pulled on the right side.  Patient today presents with elevated blood pressure readings.  States that he is not been consistent with blood pressure medicines.  He states that his blood pressure runs fine at home.  He denies headaches, dizziness or blurred vision.    Review of Systems:    Review of Systems    12 point review of systems conducted, negative except as stated in the history of present illness. See HPI for details.     Previous History      Review of patient's allergies indicates:  No Known Allergies    Past Medical History:   Diagnosis Date    HTN (hypertension)     Hyperlipidemia     Obesity, unspecified      Current Outpatient Medications   Medication Instructions    atorvastatin (LIPITOR) 10 mg, Oral, Daily    hydroCHLOROthiazide (HYDRODIURIL) 12.5 mg, Oral, Daily    hyoscyamine (ANASPAZ,LEVSIN) 0.125 mg Tab TAKE 1 TABLET(125 MCG) BY MOUTH EVERY 4 HOURS AS NEEDED    ibuprofen (ADVIL,MOTRIN) 800 mg, Oral, 3 times daily    meloxicam (MOBIC) 15 mg, Oral, Daily    ondansetron (ZOFRAN) 4 mg, Oral, Every 6 hours     History reviewed. No pertinent surgical history.  Family History   Problem Relation Age of Onset    Hypertension Mother     Diabetes Mother     Diabetes Brother        Social History     Tobacco Use    Smoking  status: Every Day     Current packs/day: 1.00     Types: Cigarettes    Smokeless tobacco: Never   Substance Use Topics    Alcohol use: Not Currently    Drug use: Yes     Types: Marijuana        Health Maintenance      Health Maintenance   Topic Date Due    Hepatitis C Screening  Never done    TETANUS VACCINE  Never done    Lipid Panel  01/10/2028       OBJECTIVE:     Physical Exam      Vital Signs Reviewed   Visit Vitals  BP (!) 164/99 (BP Location: Left arm)   Pulse 77   Temp 98.4 °F (36.9 °C)   Wt 96.3 kg (212 lb 6.4 oz)   SpO2 100%   BMI 33.27 kg/m²       Physical Exam    Physical Exam:  General: Alert, well nourished, no acute distress, non-toxic appearing.   Eyes: Anicteric sclera, without conjunctival injection, normal lids, no purulent drainage, EOMs grossly intact.   Ears: No tragal tenderness. Tympanic membranes intact, pearly grey, without effusion or erythema and with a positive light reflex.   Mouth: Posterior pharynx without erythema. No exudate, ulcerations, or lesion. No tonsillar swelling.   Neck: Supple, full ROM, no rigidity, no cervical adenopathy.   Cardio: Normal rate and rhythm    Resp: Respirations even and unlabored, clear to auscultation bilaterally.   Abd: No ecchymosis or distension. Normal bowel sounds in all 4 quadrants. No tenderness to palpation. No rebound tenderness or guarding. No CVA tenderness.   Skin: No rashes or open lesions noted.   MSK: No swelling. No abrasions or signs of trauma. Ambulating without assistance.   Neuro: Alert,oriented No focal deficits noted. Facial expressions even.   Psych: Cooperative, Normal affect      Procedures    Procedures     Labs     Results for orders placed or performed in visit on 01/10/23   Lipid Panel   Result Value Ref Range    Cholesterol Total 182 <=200 mg/dL    HDL Cholesterol 31 (L) 35 - 60 mg/dL    Triglyceride 129 34 - 140 mg/dL    Cholesterol/HDL Ratio 6 (H) 0 - 5    Very Low Density Lipoprotein 26     LDL Cholesterol 125.00 50.00 -  140.00 mg/dL   Comprehensive Metabolic Panel   Result Value Ref Range    Sodium Level 143 136 - 145 mmol/L    Potassium Level 4.0 3.5 - 5.1 mmol/L    Chloride 107 98 - 107 mmol/L    Carbon Dioxide 27 22 - 29 mmol/L    Glucose Level 93 74 - 100 mg/dL    Blood Urea Nitrogen 10.7 8.9 - 20.6 mg/dL    Creatinine 0.93 0.73 - 1.18 mg/dL    Calcium Level Total 9.1 8.4 - 10.2 mg/dL    Protein Total 6.4 6.4 - 8.3 gm/dL    Albumin Level 3.7 3.5 - 5.0 g/dL    Globulin 2.7 2.4 - 3.5 gm/dL    Albumin/Globulin Ratio 1.4 1.1 - 2.0 ratio    Bilirubin Total 0.6 <=1.5 mg/dL    Alkaline Phosphatase 94 40 - 150 unit/L    Alanine Aminotransferase 13 0 - 55 unit/L    Aspartate Aminotransferase 15 5 - 34 unit/L    eGFR >60 mls/min/1.73/m2       Chemistry:  Lab Results   Component Value Date     01/10/2023    K 4.0 01/10/2023    CHLORIDE 107 01/10/2023    BUN 10.7 01/10/2023    CREATININE 0.93 01/10/2023    EGFRNORACEVR >60 01/10/2023    GLUCOSE 93 01/10/2023    CALCIUM 9.1 01/10/2023    ALKPHOS 94 01/10/2023    LABPROT 6.4 01/10/2023    ALBUMIN 3.7 01/10/2023    BILIDIR 0.10 12/02/2017    IBILI 0.31 12/02/2017    AST 15 01/10/2023    ALT 13 01/10/2023    NRGCSTDY61EI 21.6 (L) 05/20/2022    TSH 2.4280 05/20/2022    PSA 1.45 05/20/2022        Lab Results   Component Value Date    HGBA1C 5.4 05/20/2022        Hematology:  Lab Results   Component Value Date    WBC 11.2 12/14/2022    HGB 14.3 12/14/2022    HCT 43.9 12/14/2022     12/14/2022       Lipid Panel:  Lab Results   Component Value Date    CHOL 182 01/10/2023    HDL 31 (L) 01/10/2023    .00 01/10/2023    TRIG 129 01/10/2023    TOTALCHOLEST 6 (H) 01/10/2023        Urine:  Lab Results   Component Value Date    COLORUA Yellow 12/14/2022    APPEARANCEUA Clear 12/14/2022    SGUA 1.020 12/14/2022    PHUA 8.5 12/14/2022    PROTEINUA Negative 12/14/2022    GLUCOSEUA Negative 12/14/2022    KETONESUA 40 (A) 12/14/2022    BLOODUA Negative 12/14/2022    NITRITESUA Negative  12/14/2022    LEUKOCYTESUR Negative 12/14/2022    RBCUA None Seen 12/14/2022    WBCUA None Seen 12/14/2022    BACTERIA None Seen 12/14/2022         Assessment            ICD-10-CM ICD-9-CM   1. Neck pain  M54.2 723.1   2. Lumbar pain  M54.50 724.2   3. Hypertension, unspecified type  I10 401.9       Plan       1. Neck pain  - X-Ray Cervical Spine AP And Lateral; Future  - ibuprofen (ADVIL,MOTRIN) 800 MG tablet; Take 1 tablet (800 mg total) by mouth 3 (three) times daily.  Dispense: 90 tablet; Refill: 0    2. Lumbar pain  - X-Ray Lumbar Spine AP And Lateral; Future  - ibuprofen (ADVIL,MOTRIN) 800 MG tablet; Take 1 tablet (800 mg total) by mouth 3 (three) times daily.  Dispense: 90 tablet; Refill: 0    3. Hypertension, unspecified type  Recommend patient re-initiate start blood pressure medicine daily as prescribed.- hydroCHLOROthiazide (HYDRODIURIL) 12.5 MG Tab; Take 1 tablet (12.5 mg total) by mouth once daily.  Dispense: 30 tablet; Refill: 2  t medication as prescribed   Low salt/ DASH diet   Discussed lifestyle modifications.   encourage aerobic excise at least 30 mins a day   Monitor BP daily goal less than 140/90.   Denies headaches, blurred vision, or dizziness    Recommend patient follow up with dentist for facial swelling patient is 4 weeks post tooth extraction    Orders Placed This Encounter    X-Ray Cervical Spine AP And Lateral    X-Ray Lumbar Spine AP And Lateral    ibuprofen (ADVIL,MOTRIN) 800 MG tablet    hydroCHLOROthiazide (HYDRODIURIL) 12.5 MG Tab      Medication List with Changes/Refills   New Medications    IBUPROFEN (ADVIL,MOTRIN) 800 MG TABLET    Take 1 tablet (800 mg total) by mouth 3 (three) times daily.   Current Medications    ATORVASTATIN (LIPITOR) 10 MG TABLET    Take 1 tablet (10 mg total) by mouth once daily.    HYOSCYAMINE (ANASPAZ,LEVSIN) 0.125 MG TAB    TAKE 1 TABLET(125 MCG) BY MOUTH EVERY 4 HOURS AS NEEDED    MELOXICAM (MOBIC) 15 MG TABLET    Take 1 tablet (15 mg total) by mouth  once daily.    ONDANSETRON (ZOFRAN) 4 MG TABLET    Take 1 tablet (4 mg total) by mouth every 6 (six) hours.   Changed and/or Refilled Medications    Modified Medication Previous Medication    HYDROCHLOROTHIAZIDE (HYDRODIURIL) 12.5 MG TAB hydroCHLOROthiazide (HYDRODIURIL) 12.5 MG Tab       Take 1 tablet (12.5 mg total) by mouth once daily.    Take 1 tablet (12.5 mg total) by mouth once daily.       Follow up in about 4 weeks (around 9/25/2023).   Follow up in about 4 weeks (around 9/25/2023). In addition to their scheduled follow up, the patient has also been instructed to follow up on as needed basis.   Future Appointments   Date Time Provider Department Center   9/25/2023 11:00 AM Wander Car FNP [facility]   10/11/2023  9:30 AM Wander Car FNP [facility]

## 2023-09-20 ENCOUNTER — LAB VISIT (OUTPATIENT)
Dept: LAB | Facility: HOSPITAL | Age: 43
End: 2023-09-20
Attending: NURSE PRACTITIONER
Payer: MEDICAID

## 2023-09-20 DIAGNOSIS — Z00.00 WELLNESS EXAMINATION: ICD-10-CM

## 2023-09-20 LAB
ALBUMIN SERPL-MCNC: 4.2 G/DL (ref 3.5–5)
ALBUMIN/GLOB SERPL: 1.2 RATIO (ref 1.1–2)
ALP SERPL-CCNC: 107 UNIT/L (ref 40–150)
ALT SERPL-CCNC: 20 UNIT/L (ref 0–55)
AST SERPL-CCNC: 25 UNIT/L (ref 5–34)
BASOPHILS # BLD AUTO: 0.05 X10(3)/MCL
BASOPHILS NFR BLD AUTO: 0.6 %
BILIRUB SERPL-MCNC: 0.4 MG/DL
BUN SERPL-MCNC: 7.7 MG/DL (ref 8.9–20.6)
CALCIUM SERPL-MCNC: 9.6 MG/DL (ref 8.4–10.2)
CHLORIDE SERPL-SCNC: 108 MMOL/L (ref 98–107)
CHOLEST SERPL-MCNC: 200 MG/DL
CHOLEST/HDLC SERPL: 5 {RATIO} (ref 0–5)
CO2 SERPL-SCNC: 27 MMOL/L (ref 22–29)
CREAT SERPL-MCNC: 1.07 MG/DL (ref 0.73–1.18)
DEPRECATED CALCIDIOL+CALCIFEROL SERPL-MC: 30.6 NG/ML (ref 30–80)
EOSINOPHIL # BLD AUTO: 0.51 X10(3)/MCL (ref 0–0.9)
EOSINOPHIL NFR BLD AUTO: 5.8 %
ERYTHROCYTE [DISTWIDTH] IN BLOOD BY AUTOMATED COUNT: 14.2 % (ref 11.5–17)
EST. AVERAGE GLUCOSE BLD GHB EST-MCNC: 99.7 MG/DL
GFR SERPLBLD CREATININE-BSD FMLA CKD-EPI: >60 MLS/MIN/1.73/M2
GLOBULIN SER-MCNC: 3.4 GM/DL (ref 2.4–3.5)
GLUCOSE SERPL-MCNC: 90 MG/DL (ref 74–100)
HBA1C MFR BLD: 5.1 %
HCT VFR BLD AUTO: 45.9 % (ref 42–52)
HDLC SERPL-MCNC: 43 MG/DL (ref 35–60)
HGB BLD-MCNC: 14.2 G/DL (ref 14–18)
IMM GRANULOCYTES # BLD AUTO: 0.02 X10(3)/MCL (ref 0–0.04)
IMM GRANULOCYTES NFR BLD AUTO: 0.2 %
LDLC SERPL CALC-MCNC: 141 MG/DL (ref 50–140)
LYMPHOCYTES # BLD AUTO: 3.2 X10(3)/MCL (ref 0.6–4.6)
LYMPHOCYTES NFR BLD AUTO: 36.6 %
MCH RBC QN AUTO: 27.6 PG (ref 27–31)
MCHC RBC AUTO-ENTMCNC: 30.9 G/DL (ref 33–36)
MCV RBC AUTO: 89.3 FL (ref 80–94)
MONOCYTES # BLD AUTO: 0.58 X10(3)/MCL (ref 0.1–1.3)
MONOCYTES NFR BLD AUTO: 6.6 %
NEUTROPHILS # BLD AUTO: 4.39 X10(3)/MCL (ref 2.1–9.2)
NEUTROPHILS NFR BLD AUTO: 50.2 %
PLATELET # BLD AUTO: 281 X10(3)/MCL (ref 130–400)
PMV BLD AUTO: 10.2 FL (ref 7.4–10.4)
POTASSIUM SERPL-SCNC: 3.8 MMOL/L (ref 3.5–5.1)
PROT SERPL-MCNC: 7.6 GM/DL (ref 6.4–8.3)
RBC # BLD AUTO: 5.14 X10(6)/MCL (ref 4.7–6.1)
SODIUM SERPL-SCNC: 144 MMOL/L (ref 136–145)
TRIGL SERPL-MCNC: 82 MG/DL (ref 34–140)
TSH SERPL-ACNC: 1.05 UIU/ML (ref 0.35–4.94)
VLDLC SERPL CALC-MCNC: 16 MG/DL
WBC # SPEC AUTO: 8.75 X10(3)/MCL (ref 4.5–11.5)

## 2023-09-20 PROCEDURE — 80053 COMPREHEN METABOLIC PANEL: CPT

## 2023-09-20 PROCEDURE — 83036 HEMOGLOBIN GLYCOSYLATED A1C: CPT

## 2023-09-20 PROCEDURE — 80061 LIPID PANEL: CPT

## 2023-09-20 PROCEDURE — 85025 COMPLETE CBC W/AUTO DIFF WBC: CPT

## 2023-09-20 PROCEDURE — 82306 VITAMIN D 25 HYDROXY: CPT

## 2023-09-20 PROCEDURE — 84443 ASSAY THYROID STIM HORMONE: CPT

## 2023-09-20 PROCEDURE — 36415 COLL VENOUS BLD VENIPUNCTURE: CPT

## 2023-09-25 ENCOUNTER — OFFICE VISIT (OUTPATIENT)
Dept: FAMILY MEDICINE | Facility: CLINIC | Age: 43
End: 2023-09-25
Payer: MEDICAID

## 2023-09-25 DIAGNOSIS — M54.50 LUMBAR PAIN: Primary | ICD-10-CM

## 2023-09-25 PROCEDURE — 99213 PR OFFICE/OUTPT VISIT, EST, LEVL III, 20-29 MIN: ICD-10-PCS | Mod: 95,,, | Performed by: NURSE PRACTITIONER

## 2023-09-25 PROCEDURE — 3044F PR MOST RECENT HEMOGLOBIN A1C LEVEL <7.0%: ICD-10-PCS | Mod: CPTII,95,, | Performed by: NURSE PRACTITIONER

## 2023-09-25 PROCEDURE — 99213 OFFICE O/P EST LOW 20 MIN: CPT | Mod: 95,,, | Performed by: NURSE PRACTITIONER

## 2023-09-25 PROCEDURE — 3044F HG A1C LEVEL LT 7.0%: CPT | Mod: CPTII,95,, | Performed by: NURSE PRACTITIONER

## 2023-09-25 NOTE — PROGRESS NOTES
Primary Care Telemedicine Note    The patient location is:  Patient Home   The chief complaint leading to consultation is: xray results/ back /hip pain      Visit type: Virtual visit with synchronous audio only and video  Each patient to whom he or she provides medical services by telemedicine is: (1) informed of the relationship between the physician and patient and the respective role of any other health care provider with respect to management of the patient; and (2) notified that he or she may decline to receive medical services by telemedicine and may withdraw from such care at any time.     History of Present Illness      Chief Complaint: xray results/ back /hip pain    HPI:  Patient is a 43 y.o. year old male who is doing a telemedicine visit today for hip and back pain.  Patient had incident way fell a few months ago he is continued to have back pain and left    Review of Systems:  General: Denies fever, chills, fatigue, myalgias, and change in appetite   Eyes: Denies change in vision, eye redness, eye drainage, eye pain  ENT: Denies ear pain or pressure, rhinorrhea, nasal congestion, sore throat, and trouble swallowing  Resp: Denies wheezing, and shortness of breath   Cardio: Denies chest pain, palpitations, pleuritic pain, and edema   GI: Denies nausea, vomiting, diarrhea, and abdominal pain   : Denies dysuria, hematuria, and discharge   MSK: Denies trauma, joint pain, and trouble ambulating   Neuro: Denies LOC, dizziness, seizure like activity, and focal deficits   Skin: Denies rashes, open lesions and ulcers      Previous History      Review of patient's allergies indicates:  No Known Allergies    Past Medical History:   Diagnosis Date    HTN (hypertension)     Hyperlipidemia     Obesity, unspecified      Current Outpatient Medications   Medication Instructions    atorvastatin (LIPITOR) 10 mg, Oral, Daily    hydroCHLOROthiazide (HYDRODIURIL) 12.5 mg, Oral, Daily    hyoscyamine (ANASPAZ,LEVSIN) 0.125 mg  Tab TAKE 1 TABLET(125 MCG) BY MOUTH EVERY 4 HOURS AS NEEDED    ibuprofen (ADVIL,MOTRIN) 800 mg, Oral, 3 times daily    meloxicam (MOBIC) 15 mg, Oral, Daily    ondansetron (ZOFRAN) 4 mg, Oral, Every 6 hours     No past surgical history on file.  Family History   Problem Relation Age of Onset    Hypertension Mother     Diabetes Mother     Diabetes Brother        Social History     Tobacco Use    Smoking status: Every Day     Current packs/day: 1.00     Types: Cigarettes    Smokeless tobacco: Never   Substance Use Topics    Alcohol use: Not Currently    Drug use: Yes     Types: Marijuana        Health Maintenance      Health Maintenance   Topic Date Due    Hepatitis C Screening  Never done    TETANUS VACCINE  Never done    Lipid Panel  09/20/2028       OBJECTIVE:     Physical Exam      Vital Signs Reviewed   BP Readings from Last 3 Encounters:   08/28/23 (!) 164/99   04/17/23 (!) 160/102   01/11/23 (!) 140/79         Physical Examination: Physical exam was not performed during this virtual visit.  Limited through video/audio   Physical Exam    Vitals and nursing note reviewed.   Constitutional:       Appearance: Normal appearance, non toxic .   HENT:      Head: Normocephalic and atraumatic.   RESP- NO SOB, NON labored   Neurological:      General: No focal deficit present.      Mental Status:alert and oriented to person, place, and time.   Psychiatric:         Mood and Affect: Mood normal.         Behavior: Behavior normal.         Thought Content: Thought content normal.         Judgment: Judgment normal.            Labs     Results for orders placed or performed in visit on 09/20/23   Comprehensive Metabolic Panel   Result Value Ref Range    Sodium Level 144 136 - 145 mmol/L    Potassium Level 3.8 3.5 - 5.1 mmol/L    Chloride 108 (H) 98 - 107 mmol/L    Carbon Dioxide 27 22 - 29 mmol/L    Glucose Level 90 74 - 100 mg/dL    Blood Urea Nitrogen 7.7 (L) 8.9 - 20.6 mg/dL    Creatinine 1.07 0.73 - 1.18 mg/dL    Calcium  Level Total 9.6 8.4 - 10.2 mg/dL    Protein Total 7.6 6.4 - 8.3 gm/dL    Albumin Level 4.2 3.5 - 5.0 g/dL    Globulin 3.4 2.4 - 3.5 gm/dL    Albumin/Globulin Ratio 1.2 1.1 - 2.0 ratio    Bilirubin Total 0.4 <=1.5 mg/dL    Alkaline Phosphatase 107 40 - 150 unit/L    Alanine Aminotransferase 20 0 - 55 unit/L    Aspartate Aminotransferase 25 5 - 34 unit/L    eGFR >60 mls/min/1.73/m2   Hemoglobin A1C   Result Value Ref Range    Hemoglobin A1c 5.1 <=7.0 %    Estimated Average Glucose 99.7 mg/dL   Vitamin D   Result Value Ref Range    Vit D 25 OH 30.6 30.0 - 80.0 ng/mL   TSH   Result Value Ref Range    Thyroid Stimulating Hormone 1.047 0.350 - 4.940 uIU/mL   Lipid Panel   Result Value Ref Range    Cholesterol Total 200 <=200 mg/dL    HDL Cholesterol 43 35 - 60 mg/dL    Triglyceride 82 34 - 140 mg/dL    Cholesterol/HDL Ratio 5 0 - 5    Very Low Density Lipoprotein 16     LDL Cholesterol 141.00 (H) 50.00 - 140.00 mg/dL   CBC with Differential   Result Value Ref Range    WBC 8.75 4.50 - 11.50 x10(3)/mcL    RBC 5.14 4.70 - 6.10 x10(6)/mcL    Hgb 14.2 14.0 - 18.0 g/dL    Hct 45.9 42.0 - 52.0 %    MCV 89.3 80.0 - 94.0 fL    MCH 27.6 27.0 - 31.0 pg    MCHC 30.9 (L) 33.0 - 36.0 g/dL    RDW 14.2 11.5 - 17.0 %    Platelet 281 130 - 400 x10(3)/mcL    MPV 10.2 7.4 - 10.4 fL    Neut % 50.2 %    Lymph % 36.6 %    Mono % 6.6 %    Eos % 5.8 %    Basophil % 0.6 %    Lymph # 3.20 0.6 - 4.6 x10(3)/mcL    Neut # 4.39 2.1 - 9.2 x10(3)/mcL    Mono # 0.58 0.1 - 1.3 x10(3)/mcL    Eos # 0.51 0 - 0.9 x10(3)/mcL    Baso # 0.05 <=0.2 x10(3)/mcL    IG# 0.02 0 - 0.04 x10(3)/mcL    IG% 0.2 %        Assessment       1. Lumbar pain           Plan           Medication List with Changes/Refills   Current Medications    ATORVASTATIN (LIPITOR) 10 MG TABLET    Take 1 tablet (10 mg total) by mouth once daily.    HYDROCHLOROTHIAZIDE (HYDRODIURIL) 12.5 MG TAB    Take 1 tablet (12.5 mg total) by mouth once daily.    HYOSCYAMINE (ANASPAZ,LEVSIN) 0.125 MG TAB     TAKE 1 TABLET(125 MCG) BY MOUTH EVERY 4 HOURS AS NEEDED    IBUPROFEN (ADVIL,MOTRIN) 800 MG TABLET    Take 1 tablet (800 mg total) by mouth 3 (three) times daily.    MELOXICAM (MOBIC) 15 MG TABLET    Take 1 tablet (15 mg total) by mouth once daily.    ONDANSETRON (ZOFRAN) 4 MG TABLET    Take 1 tablet (4 mg total) by mouth every 6 (six) hours.       Face to face time spent with patient exceeds 15 minutes, over 50% of which was used for education and counseling regarding medical conditions, current medications including risk/benefit and side effects/adverse events, over the counter medications-uses/doses, home self-care and contact precautions, and red flags and indications for immediate medical attention. The patient is receptive, expresses understanding and is agreeable to plan. All questions answered.         Future Appointments   Date Time Provider Department Center   10/11/2023  9:30 AM Wander Car FNP [facility]

## 2023-10-01 DIAGNOSIS — M54.2 NECK PAIN: ICD-10-CM

## 2023-10-01 DIAGNOSIS — M54.50 LUMBAR PAIN: ICD-10-CM

## 2023-10-01 DIAGNOSIS — E78.00 HYPERCHOLESTEROLEMIA: ICD-10-CM

## 2023-10-02 RX ORDER — IBUPROFEN 800 MG/1
800 TABLET ORAL 3 TIMES DAILY
Qty: 90 TABLET | Refills: 0 | Status: SHIPPED | OUTPATIENT
Start: 2023-10-02 | End: 2024-01-20 | Stop reason: SDUPTHER

## 2023-10-02 RX ORDER — ATORVASTATIN CALCIUM 10 MG/1
10 TABLET, FILM COATED ORAL DAILY
Qty: 90 TABLET | Refills: 0 | Status: SHIPPED | OUTPATIENT
Start: 2023-10-02 | End: 2024-01-16 | Stop reason: SDUPTHER

## 2023-10-06 RX ORDER — MELOXICAM 15 MG/1
15 TABLET ORAL DAILY
Qty: 30 TABLET | Refills: 0 | Status: SHIPPED | OUTPATIENT
Start: 2023-10-06

## 2023-10-10 DIAGNOSIS — M54.50 LUMBAR PAIN: Primary | ICD-10-CM

## 2023-10-10 DIAGNOSIS — M54.2 NECK PAIN: ICD-10-CM

## 2023-10-24 ENCOUNTER — OFFICE VISIT (OUTPATIENT)
Dept: FAMILY MEDICINE | Facility: CLINIC | Age: 43
End: 2023-10-24
Payer: MEDICAID

## 2023-10-24 VITALS — BODY MASS INDEX: 33.3 KG/M2 | WEIGHT: 212.63 LBS

## 2023-10-24 DIAGNOSIS — M54.2 NECK PAIN: ICD-10-CM

## 2023-10-24 DIAGNOSIS — M54.50 LUMBAR PAIN: Primary | ICD-10-CM

## 2023-10-24 PROCEDURE — 3044F HG A1C LEVEL LT 7.0%: CPT | Mod: CPTII,95,, | Performed by: NURSE PRACTITIONER

## 2023-10-24 PROCEDURE — 99213 OFFICE O/P EST LOW 20 MIN: CPT | Mod: 95,,, | Performed by: NURSE PRACTITIONER

## 2023-10-24 PROCEDURE — 3008F BODY MASS INDEX DOCD: CPT | Mod: CPTII,95,, | Performed by: NURSE PRACTITIONER

## 2023-10-24 PROCEDURE — 99213 PR OFFICE/OUTPT VISIT, EST, LEVL III, 20-29 MIN: ICD-10-PCS | Mod: 95,,, | Performed by: NURSE PRACTITIONER

## 2023-10-24 PROCEDURE — 3008F PR BODY MASS INDEX (BMI) DOCUMENTED: ICD-10-PCS | Mod: CPTII,95,, | Performed by: NURSE PRACTITIONER

## 2023-10-24 PROCEDURE — 1159F MED LIST DOCD IN RCRD: CPT | Mod: CPTII,95,, | Performed by: NURSE PRACTITIONER

## 2023-10-24 PROCEDURE — 3044F PR MOST RECENT HEMOGLOBIN A1C LEVEL <7.0%: ICD-10-PCS | Mod: CPTII,95,, | Performed by: NURSE PRACTITIONER

## 2023-10-24 PROCEDURE — 1159F PR MEDICATION LIST DOCUMENTED IN MEDICAL RECORD: ICD-10-PCS | Mod: CPTII,95,, | Performed by: NURSE PRACTITIONER

## 2023-10-24 NOTE — PROGRESS NOTES
Primary Care Telemedicine Note    The patient location is:  Patient Home   The chief complaint leading to consultation is: Follow-up (Check up - hip bothering him ,pain is the same as before - its stable .)      Visit type: Virtual visit with synchronous audio only and video  Each patient to whom he or she provides medical services by telemedicine is: (1) informed of the relationship between the physician and patient and the respective role of any other health care provider with respect to management of the patient; and (2) notified that he or she may decline to receive medical services by telemedicine and may withdraw from such care at any time.     History of Present Illness      Chief Complaint: Follow-up (Check up - hip bothering him ,pain is the same as before - its stable .)    HPI:  Patient is a 43 y.o. year old male who is doing a telemedicine visit today for back pain follow up.    Pt report he had to reschedule his PT appointment.    He is actively working out in the gym .  She reports overall wrong his back is better not any worse    Review of Systems:  General: Denies fever, chills, fatigue, myalgias, and change in appetite   Eyes: Denies change in vision, eye redness, eye drainage, eye pain  ENT: Denies ear pain or pressure, rhinorrhea, nasal congestion, sore throat, and trouble swallowing  Resp: Denies wheezing, and shortness of breath   Cardio: Denies chest pain, palpitations, pleuritic pain, and edema   GI: Denies nausea, vomiting, diarrhea, and abdominal pain   : Denies dysuria, hematuria, and discharge   MSK: Denies trauma, joint pain, and trouble ambulating   Neuro: Denies LOC, dizziness, seizure like activity, and focal deficits   Skin: Denies rashes, open lesions and ulcers      Previous History      Review of patient's allergies indicates:  No Known Allergies    Past Medical History:   Diagnosis Date    HTN (hypertension)     Hyperlipidemia     Obesity, unspecified      Current Outpatient  Medications   Medication Instructions    atorvastatin (LIPITOR) 10 mg, Oral, Daily    hydroCHLOROthiazide (HYDRODIURIL) 12.5 mg, Oral, Daily    ibuprofen (ADVIL,MOTRIN) 800 mg, Oral, 3 times daily    meloxicam (MOBIC) 15 mg, Oral, Daily     History reviewed. No pertinent surgical history.  Family History   Problem Relation Age of Onset    Hypertension Mother     Diabetes Mother     Diabetes Brother        Social History     Tobacco Use    Smoking status: Every Day     Current packs/day: 1.00     Types: Cigarettes    Smokeless tobacco: Never   Substance Use Topics    Alcohol use: Not Currently    Drug use: Yes     Types: Marijuana        Health Maintenance      Health Maintenance   Topic Date Due    Hepatitis C Screening  Never done    TETANUS VACCINE  Never done    Lipid Panel  09/20/2028       OBJECTIVE:     Physical Exam      Vital Signs Reviewed   BP Readings from Last 3 Encounters:   08/28/23 (!) 164/99   04/17/23 (!) 160/102   01/11/23 (!) 140/79         Physical Examination: Physical exam was not performed during this virtual visit.  Limited through video/audio   Physical Exam    Vitals and nursing note reviewed.   Constitutional:       Appearance: Normal appearance, non toxic .   HENT:      Head: Normocephalic and atraumatic.   RESP- NO SOB, NON labored   Neurological:      General: No focal deficit present.      Mental Status:alert and oriented to person, place, and time.   Psychiatric:         Mood and Affect: Mood normal.         Behavior: Behavior normal.         Thought Content: Thought content normal.         Judgment: Judgment normal.            Labs     Results for orders placed or performed in visit on 09/20/23   Comprehensive Metabolic Panel   Result Value Ref Range    Sodium Level 144 136 - 145 mmol/L    Potassium Level 3.8 3.5 - 5.1 mmol/L    Chloride 108 (H) 98 - 107 mmol/L    Carbon Dioxide 27 22 - 29 mmol/L    Glucose Level 90 74 - 100 mg/dL    Blood Urea Nitrogen 7.7 (L) 8.9 - 20.6 mg/dL     Creatinine 1.07 0.73 - 1.18 mg/dL    Calcium Level Total 9.6 8.4 - 10.2 mg/dL    Protein Total 7.6 6.4 - 8.3 gm/dL    Albumin Level 4.2 3.5 - 5.0 g/dL    Globulin 3.4 2.4 - 3.5 gm/dL    Albumin/Globulin Ratio 1.2 1.1 - 2.0 ratio    Bilirubin Total 0.4 <=1.5 mg/dL    Alkaline Phosphatase 107 40 - 150 unit/L    Alanine Aminotransferase 20 0 - 55 unit/L    Aspartate Aminotransferase 25 5 - 34 unit/L    eGFR >60 mls/min/1.73/m2   Hemoglobin A1C   Result Value Ref Range    Hemoglobin A1c 5.1 <=7.0 %    Estimated Average Glucose 99.7 mg/dL   Vitamin D   Result Value Ref Range    Vit D 25 OH 30.6 30.0 - 80.0 ng/mL   TSH   Result Value Ref Range    TSH 1.047 0.350 - 4.940 uIU/mL   Lipid Panel   Result Value Ref Range    Cholesterol Total 200 <=200 mg/dL    HDL Cholesterol 43 35 - 60 mg/dL    Triglyceride 82 34 - 140 mg/dL    Cholesterol/HDL Ratio 5 0 - 5    Very Low Density Lipoprotein 16     LDL Cholesterol 141.00 (H) 50.00 - 140.00 mg/dL   CBC with Differential   Result Value Ref Range    WBC 8.75 4.50 - 11.50 x10(3)/mcL    RBC 5.14 4.70 - 6.10 x10(6)/mcL    Hgb 14.2 14.0 - 18.0 g/dL    Hct 45.9 42.0 - 52.0 %    MCV 89.3 80.0 - 94.0 fL    MCH 27.6 27.0 - 31.0 pg    MCHC 30.9 (L) 33.0 - 36.0 g/dL    RDW 14.2 11.5 - 17.0 %    Platelet 281 130 - 400 x10(3)/mcL    MPV 10.2 7.4 - 10.4 fL    Neut % 50.2 %    Lymph % 36.6 %    Mono % 6.6 %    Eos % 5.8 %    Basophil % 0.6 %    Lymph # 3.20 0.6 - 4.6 x10(3)/mcL    Neut # 4.39 2.1 - 9.2 x10(3)/mcL    Mono # 0.58 0.1 - 1.3 x10(3)/mcL    Eos # 0.51 0 - 0.9 x10(3)/mcL    Baso # 0.05 <=0.2 x10(3)/mcL    IG# 0.02 0 - 0.04 x10(3)/mcL    IG% 0.2 %        Assessment       1. Lumbar pain           Plan     1. Lumbar pain        Unchanged   PT OT ordered patient reschedule appointment states that he is currently working out in the gym.  Continue OTC Tylenol ibuprofen for pain  Instructed patient to follow up physical therapy and follow back up in office once he completes PT.  Patient  verbalized understand  Medication List with Changes/Refills   Current Medications    ATORVASTATIN (LIPITOR) 10 MG TABLET    Take 1 tablet (10 mg total) by mouth once daily.    HYDROCHLOROTHIAZIDE (HYDRODIURIL) 12.5 MG TAB    Take 1 tablet (12.5 mg total) by mouth once daily.    IBUPROFEN (ADVIL,MOTRIN) 800 MG TABLET    Take 1 tablet (800 mg total) by mouth 3 (three) times daily.    MELOXICAM (MOBIC) 15 MG TABLET    Take 1 tablet (15 mg total) by mouth once daily.   Discontinued Medications    HYOSCYAMINE (ANASPAZ,LEVSIN) 0.125 MG TAB    TAKE 1 TABLET(125 MCG) BY MOUTH EVERY 4 HOURS AS NEEDED    ONDANSETRON (ZOFRAN) 4 MG TABLET    Take 1 tablet (4 mg total) by mouth every 6 (six) hours.       Face to face time spent with patient exceeds 15 minutes, over 50% of which was used for education and counseling regarding medical conditions, current medications including risk/benefit and side effects/adverse events, over the counter medications-uses/doses, home self-care and contact precautions, and red flags and indications for immediate medical attention. The patient is receptive, expresses understanding and is agreeable to plan. All questions answered.         No future appointments.

## 2023-11-02 ENCOUNTER — PATIENT MESSAGE (OUTPATIENT)
Dept: REHABILITATION | Facility: HOSPITAL | Age: 43
End: 2023-11-02

## 2023-11-07 ENCOUNTER — CLINICAL SUPPORT (OUTPATIENT)
Dept: REHABILITATION | Facility: HOSPITAL | Age: 43
End: 2023-11-07
Attending: NURSE PRACTITIONER
Payer: MEDICAID

## 2023-11-07 DIAGNOSIS — R29.898 IMPAIRED FLEXIBILITY OF LOWER EXTREMITY: ICD-10-CM

## 2023-11-07 DIAGNOSIS — M53.3 SI (SACROILIAC) JOINT DYSFUNCTION: ICD-10-CM

## 2023-11-07 DIAGNOSIS — R29.898 IMPAIRED STRENGTH OF HIP MUSCLES: ICD-10-CM

## 2023-11-07 DIAGNOSIS — M54.50 LUMBAR PAIN: ICD-10-CM

## 2023-11-07 PROCEDURE — 97161 PT EVAL LOW COMPLEX 20 MIN: CPT

## 2023-11-08 ENCOUNTER — TELEPHONE (OUTPATIENT)
Dept: FAMILY MEDICINE | Facility: CLINIC | Age: 43
End: 2023-11-08

## 2023-11-08 PROBLEM — R29.898 IMPAIRED FLEXIBILITY OF LOWER EXTREMITY: Status: ACTIVE | Noted: 2023-11-08

## 2023-11-08 PROBLEM — M54.50 LUMBAR PAIN: Status: ACTIVE | Noted: 2023-11-08

## 2023-11-08 PROBLEM — R29.898 IMPAIRED STRENGTH OF HIP MUSCLES: Status: ACTIVE | Noted: 2023-11-08

## 2023-11-08 PROBLEM — M53.3 SI (SACROILIAC) JOINT DYSFUNCTION: Status: ACTIVE | Noted: 2023-11-08

## 2023-11-08 NOTE — PLAN OF CARE
[facility]   Physical Therapy Initial Evaluation      Name: Austyn Parks  Clinic Number: [id number]    Therapy Diagnosis:   Encounter Diagnoses   Name Primary?    Lumbar pain     Impaired flexibility of lower extremity     SI (sacroiliac) joint dysfunction     Impaired strength of hip muscles         Physician: Wander Car FNP    Physician Orders: PT Eval and Treat   Medical Diagnosis from Referral: M54.50 Lumbar Pain   Evaluation Date: 11/7/2023  Authorization Period Expiration: TBD  Plan of Care Expiration: TBD  Progress Note Due: TBD  Date of Surgery: n/a  Visit # / Visits authorized: TBD       Precautions: Standard     Time In: 1310  Time Out: 1352  Total Billable Time: 42 minutes    Subjective     Date of onset: Dec 2022    History of current condition - Austyn reports: that he fell coming down the steps landing on his left side . States that he began to have pain in the Right posterior hip area immediately after the fall. Over the next couple of months the pain began to improve but continues to linger specifically with prolonged sitting or standing. Patient states that he started going to the gym and working out to try and work through the pain.     Falls: Dec 2022    Imaging: x-rays 8/29/23: Mild degenerative disc space narrowing at the lower lumbar spine.    Prior Therapy: none  Social History:  lives with their family  Occupation: unemployed   Prior Level of Function: IND  Current Level of Function: IND    Pain:  Current 8/10, worst 9/10, best 0/10   Location: right back    Description: stabbing  Aggravating Factors: Sitting and Standing  Easing Factors: relaxation    Patients goals:  I want to be able to sit or stand for extended periods of time with no pain      Medical History:   Past Medical History:   Diagnosis Date    HTN (hypertension)     Hyperlipidemia     Obesity, unspecified        Surgical History:   Austyn Parks  has no past surgical history on file.    Medications:    Austyn has a current medication list which includes the following prescription(s): atorvastatin, hydrochlorothiazide, ibuprofen, and meloxicam.    Allergies:   Review of patient's allergies indicates:  No Known Allergies     Objective      Posture: depressed Right shoulder / Abducted scapula / Elevated Right hip   Palpation: Tenderness over right piriformis area  Sensation: light touch intact     Range of Motion/Strength:     Thoracolumbar AROM Pain/Dysfunction with Movement   Flexion 85%    Extension 100%    Right side bending 100%    Left side bending 100%    Right rotation 100%    Left rotation 100%        L/E MMT Right Left Pain/Dysfunction with Movement   Hip Flexion 4+/5 5/5    Hip Extension 4+/5 5/5    Hip Abduction 4+/5 5/5    Knee Flexion 5/5 5/5    Knee Extension 5/5 5/5    Ankle DF 5/5 5/5    Ankle PF 5/5 5/5      Flexibility:                                 RIGHT             LEFT  Hamstrings 60% 60%   Piriformis 50% 50%   Rectus  70% 70%   Quadriceps 85% 85%   Gastroc         Joint Mobility:   - Thoracic: mild limitations apophyseal glide     - Lumbar:   mild limitations apophyseal glide         Special Tests:   -Straight Leg Raise:   Right : (+)  Left : (--)  - Squish Test:   Right: (+)  Left : (--)  - SacroIliac Joint : Right posterior rotation/upslip        Gait Analysis:Without AD Assistance IND  Deviations: Reciprocal pattern with no deviations        Treatment     Total Treatment time (time-based codes) separate from Evaluation: 5 minutes     Austyn received the treatments listed below:      therapeutic exercises to develop strength, ROM, flexibility, posture, and core stabilization for 5 minutes including:  Therapeutic Exercise   Therapeutic Exercise Grid     Exercise 1  Exercise 2  Exercise 3  Exercise 4    Exercise :    Seated Hamstring stretch  Seated piriformis stretch  Post pelvic tilts  Left Single knee to chest    Repetition/Time :    3 x 20 sec   3 x 20 sec   20 x 5 sec   5 x 10 sec      Resist or Assist :                  Comment :                Done :                                Patient Education and Home Exercises     Education provided:   - written HEP     Written Home Exercises Provided: yes. Exercises were reviewed and Austyn was able to demonstrate them prior to the end of the session.  Austyn demonstrated good  understanding of the education provided. See EMR under Patient Instructions for exercises provided during therapy sessions.    Assessment     Austyn is a 43 y.o. male referred to outpatient Physical Therapy with a medical diagnosis of M54.50 Lumbar Pain . Patient presents with   Encounter Diagnoses   Name    Lumbar pain    Impaired flexibility of lower extremity    SI (sacroiliac) joint dysfunction    Impaired strength of hip muscles       Patient prognosis is Excellent.   Patient will benefit from skilled outpatient Physical Therapy to address the deficits stated above and in the chart below, provide patient /family education, and to maximize patientt's level of independence.     Plan of care discussed with patient: Yes  Patient's spiritual, cultural and educational needs considered and patient is agreeable to the plan of care and goals as stated below:     Anticipated Barriers for therapy: none    Medical Necessity is demonstrated by the following  History  Co-morbidities and personal factors that may impact the plan of care [x] LOW: no personal factors / co-morbidities  [] MODERATE: 1-2 personal factors / co-morbidities  [] HIGH: 3+ personal factors / co-morbidities    Moderate / High Support Documentation:   Co-morbidities affecting plan of care: see objective     Personal Factors:   no deficits       Examination  Body Structures and Functions, activity limitations and participation restrictions that may impact the plan of care [] LOW: addressing 1-2 elements  [x] MODERATE: 3+ elements  [] HIGH: 4+ elements (please support below)    Moderate / High Support Documentation: see  objective      Clinical Presentation [x] LOW: stable  [] MODERATE: Evolving  [] HIGH: Unstable     Decision Making/ Complexity Score: low       Goals:  Short Term Goals (2 Weeks):  1. Patient will be compliant with home exercise program to supplement therapy in promoting functional mobility.  2. Patient will perform dead Bug  with good control to demonstrate improved core strength.  3. Patient will report no pain during thoracolumbar active range of motion to promote functional mobility.  4. Patient will improve impaired lower extremity to =5/5 to improve strength for functional tasks.  Long Term Goals (4 Weeks):   1. Patient will demonstrate/ maintain equal Sacro-iliac alignment bilaterally   2. Patient will perform Dead Bug  with good control to demonstrate improved core strength.  3. Patient will improve impaired lower extremity flexibility  to >/= 90% to improve strength for functional tasks.  4. Patient will report no pain during prolonged sitting or standing to meet patient specific goal set at evaluation .   Plan     Plan of care Certification: 11/7/2023 to TBD.    Outpatient Physical Therapy 2 times weekly for 4 weeks to include the following interventions: Manual Therapy, Patient Education, Therapeutic Activities, and Therapeutic Exercise.     Brandt Steele PT        Physician's Signature: _________________________________________ Date: ________________

## 2023-11-13 DIAGNOSIS — M54.50 LUMBAR PAIN: Primary | ICD-10-CM

## 2023-11-15 ENCOUNTER — CLINICAL SUPPORT (OUTPATIENT)
Dept: REHABILITATION | Facility: HOSPITAL | Age: 43
End: 2023-11-15
Payer: MEDICAID

## 2023-11-15 DIAGNOSIS — R29.898 IMPAIRED FLEXIBILITY OF LOWER EXTREMITY: Primary | ICD-10-CM

## 2023-11-15 DIAGNOSIS — R29.898 IMPAIRED STRENGTH OF HIP MUSCLES: ICD-10-CM

## 2023-11-15 DIAGNOSIS — M53.3 SI (SACROILIAC) JOINT DYSFUNCTION: ICD-10-CM

## 2023-11-15 PROCEDURE — 97110 THERAPEUTIC EXERCISES: CPT

## 2023-11-15 NOTE — PROGRESS NOTES
[facility]   Physical Therapy Treatment Note      Name: Austyn Parks  [facility] Number: [id number]    Therapy Diagnosis:   Encounter Diagnoses   Name Primary?    Impaired flexibility of lower extremity Yes    SI (sacroiliac) joint dysfunction     Impaired strength of hip muscles      Physician: Wander Car FNP    Visit Date: 11/15/2023    Physician Orders: PT Eval and Treat   Medical Diagnosis from Referral: M54.50 Lumbar Pain   Evaluation Date: 11/7/2023  Authorization Period Expiration: 1/13/24  Plan of Care Expiration: 12/08/23  Progress Note Due: 12/29/23  Date of Surgery: n/a  Visit # / Visits authorized: 1/8        Precautions: Standard      Time In: 1434  Time Out: 1501  Total Billable Time: 27 minutes    PTA Visit #: 0/5       Subjective     Patient reports: that he is returning pain free. Doing well since last visit.   He was compliant with home exercise program.  Response to previous treatment: n/a  Functional change: n/a    Pain: 0/10  Location: right back      Objective      SI joint : Equal    Treatment     Austyn received the treatments listed below:      therapeutic exercises to develop strength, endurance, ROM, flexibility, posture, and core stabilization for 27 minutes including:  Therapeutic Exercise   Therapeutic Exercise Grid     Exercise 1  Exercise 2  Exercise 3  Exercise 4    Exercise :    Stretch Right Piriformis Stretch bilateral Hams   Stretch bilateral Rectus    Pelvic tilts      Repetition/Time :    3 x 20 sec   3 x 20 sec    3 x 20 sec    2 x 10      Resist or Assist :             5 sec     Comment :                Done :    YES   YES  YES  YES                   Exercise 5  Exercise 6  Exercise 7  Exercise 8    Exercise :    Bridges    Dead Bug  Abd Press with SB Bird Dog     Repetition/Time :    2 x 10    10   2 x 10    2 x 5      Resist or Assist :    3 sec      5 sec        Comment :                  Done :    YES    YES    Yes  Yes                   Exercise  9  Exercise 10  Exercise 11  Exercise 12    Exercise :    Cat/Cow               Repetition/Time :    10              Resist or Assist :                  Comment :                  Done :    YES                                Exercise 13 Exercise 14  Exercise 15  Exercise 16   Exercise :                  Repetition/Time :                  Resist or Assist :                  Comment :                  Done :                                  Exercise 17 Exercise 18 Exercise 19 Exercise 20    Exercise :                  Repetition/Time :                  Resist or Assist :                  Comment :                  Done :                                   Patient Education and Home Exercises       Education provided:   - reviewed written HEP     Written Home Exercises Provided: Patient instructed to cont prior HEP. Exercises were reviewed and Austyn was able to demonstrate them prior to the end of the session.  Austyn demonstrated good  understanding of the education provided. See Electronic Medical Record under Patient Instructions for exercises provided during therapy sessions    Assessment     Austyn tolerated treatment well today completing all assigned exercises with good effort and reporting no additional discomfort during treatment session. Patient required moderate verbal cues and minimal tactile cues for proper exercise execution. Noted that patient had decreased flexion in lower lumbar spine during cat/cow. Patient had difficulty with full bilateral lower extremity extension with bird dog due to weak glutes. Provided assist to slow down movement and attain full lower extremity extension.     Austyn Is progressing well towards his goals.   Patient prognosis is Excellent.     Patient will continue to benefit from skilled outpatient physical therapy to address the deficits listed in the problem list box on initial evaluation, provide pt/family education and to maximize pt's level of independence in the home and  community environment.     Patient's spiritual, cultural and educational needs considered and pt agreeable to plan of care and goals.     Anticipated barriers to physical therapy: hx of all on lower back    Goals:   Short Term Goals (2 Weeks):  1. Patient will be compliant with home exercise program to supplement therapy in promoting functional mobility.  2. Patient will perform dead Bug  with good control to demonstrate improved core strength.  3. Patient will report no pain during thoracolumbar active range of motion to promote functional mobility.  4. Patient will improve impaired lower extremity to =5/5 to improve strength for functional tasks.  Long Term Goals (4 Weeks):   1. Patient will demonstrate/ maintain equal Sacro-iliac alignment bilaterally   2. Patient will perform Dead Bug  with good control to demonstrate improved core strength.  3. Patient will improve impaired lower extremity flexibility  to >/= 90% to improve strength for functional tasks.  4. Patient will report no pain during prolonged sitting     Plan     Plan of care Certification: 11/7/2023 to 1/13/24.     Outpatient Physical Therapy 2 times weekly for 4 weeks to include the following interventions: Manual Therapy, Patient Education, Therapeutic Activities, and Therapeutic Exercise.        Brandt Steele, PT

## 2023-11-24 DIAGNOSIS — I10 HYPERTENSION, UNSPECIFIED TYPE: ICD-10-CM

## 2023-11-25 ENCOUNTER — HOSPITAL ENCOUNTER (EMERGENCY)
Facility: HOSPITAL | Age: 43
Discharge: HOME | End: 2023-11-25
Attending: FAMILY MEDICINE
Payer: MEDICAID

## 2023-11-25 VITALS
DIASTOLIC BLOOD PRESSURE: 102 MMHG | OXYGEN SATURATION: 98 % | TEMPERATURE: 100 F | BODY MASS INDEX: 32.65 KG/M2 | SYSTOLIC BLOOD PRESSURE: 178 MMHG | RESPIRATION RATE: 20 BRPM | WEIGHT: 208 LBS | HEART RATE: 90 BPM | HEIGHT: 67 IN
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
## 2023-11-25 PROCEDURE — 99284 EMERGENCY DEPT VISIT MOD MDM: CPT

## 2023-11-25 RX ORDER — CYCLOBENZAPRINE HCL 10 MG
10 TABLET ORAL 3 TIMES DAILY PRN
Qty: 15 TABLET | Refills: 0 | Status: SHIPPED | OUTPATIENT
Start: 2023-11-25 | End: 2023-11-30

## 2023-11-25 RX ORDER — DICLOFENAC SODIUM 50 MG/1
50 TABLET, DELAYED RELEASE ORAL 3 TIMES DAILY
Qty: 15 TABLET | Refills: 0 | Status: SHIPPED | OUTPATIENT
Start: 2023-11-25 | End: 2023-11-30
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
## 2023-11-27 DIAGNOSIS — I10 HYPERTENSION, UNSPECIFIED TYPE: ICD-10-CM

## 2023-11-27 RX ORDER — HYDROCHLOROTHIAZIDE 12.5 MG/1
12.5 TABLET ORAL DAILY
Qty: 30 TABLET | Refills: 2 | Status: SHIPPED | OUTPATIENT
Start: 2023-11-27

## 2023-11-27 RX ORDER — HYDROCHLOROTHIAZIDE 12.5 MG/1
12.5 TABLET ORAL
Qty: 30 TABLET | Refills: 2 | OUTPATIENT
Start: 2023-11-27

## 2023-12-27 ENCOUNTER — TELEPHONE (OUTPATIENT)
Dept: REHABILITATION | Facility: HOSPITAL | Age: 43
End: 2023-12-27
Payer: MEDICAID
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
## 2024-01-16 DIAGNOSIS — E78.00 HYPERCHOLESTEROLEMIA: ICD-10-CM

## 2024-01-16 RX ORDER — ATORVASTATIN CALCIUM 10 MG/1
10 TABLET, FILM COATED ORAL DAILY
Qty: 90 TABLET | Refills: 0 | Status: SHIPPED | OUTPATIENT
Start: 2024-01-16 | End: 2024-01-16 | Stop reason: SDUPTHER

## 2024-01-16 RX ORDER — ATORVASTATIN CALCIUM 10 MG/1
10 TABLET, FILM COATED ORAL DAILY
Qty: 90 TABLET | Refills: 0 | Status: SHIPPED | OUTPATIENT
Start: 2024-01-16 | End: 2024-04-15

## 2024-01-19 ENCOUNTER — DOCUMENTATION ONLY (OUTPATIENT)
Dept: REHABILITATION | Facility: HOSPITAL | Age: 44
End: 2024-01-19
Payer: MEDICAID
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
## 2024-01-20 DIAGNOSIS — M54.50 LUMBAR PAIN: ICD-10-CM

## 2024-01-20 DIAGNOSIS — M54.2 NECK PAIN: ICD-10-CM

## 2024-01-22 RX ORDER — IBUPROFEN 800 MG/1
800 TABLET ORAL 3 TIMES DAILY
Qty: 90 TABLET | Refills: 0 | Status: SHIPPED | OUTPATIENT
Start: 2024-01-22

## 2024-01-23 ENCOUNTER — HOSPITAL ENCOUNTER (EMERGENCY)
Facility: HOSPITAL | Age: 44
Discharge: HOME | End: 2024-01-23
Attending: SPECIALIST
Payer: MEDICAID

## 2024-01-23 VITALS
DIASTOLIC BLOOD PRESSURE: 104 MMHG | SYSTOLIC BLOOD PRESSURE: 143 MMHG | OXYGEN SATURATION: 95 % | TEMPERATURE: 100 F | BODY MASS INDEX: 31.75 KG/M2 | RESPIRATION RATE: 18 BRPM | HEART RATE: 78 BPM | HEIGHT: 68 IN | WEIGHT: 209.5 LBS

## 2024-01-23 DIAGNOSIS — U07.1 COVID-19 VIRUS INFECTION: ICD-10-CM

## 2024-01-23 DIAGNOSIS — R19.7 DIARRHEA, UNSPECIFIED TYPE: Primary | ICD-10-CM

## 2024-01-23 LAB
FLUAV AG UPPER RESP QL IA.RAPID: NOT DETECTED
FLUBV AG UPPER RESP QL IA.RAPID: NOT DETECTED
SARS-COV-2 RNA RESP QL NAA+PROBE: DETECTED

## 2024-01-23 PROCEDURE — 99284 EMERGENCY DEPT VISIT MOD MDM: CPT

## 2024-01-23 PROCEDURE — 25000003 PHARM REV CODE 250: Performed by: SPECIALIST

## 2024-01-23 PROCEDURE — 0240U COVID/FLU A&B PCR: CPT | Performed by: SPECIALIST

## 2024-01-23 RX ORDER — DIPHENOXYLATE HYDROCHLORIDE AND ATROPINE SULFATE 2.5; .025 MG/1; MG/1
2 TABLET ORAL
Status: COMPLETED | OUTPATIENT
Start: 2024-01-23 | End: 2024-01-23

## 2024-01-23 RX ORDER — HYOSCYAMINE SULFATE 0.12 MG/1
0.12 TABLET SUBLINGUAL
Status: COMPLETED | OUTPATIENT
Start: 2024-01-23 | End: 2024-01-23

## 2024-01-23 RX ORDER — DIPHENOXYLATE HYDROCHLORIDE AND ATROPINE SULFATE 2.5; .025 MG/1; MG/1
1 TABLET ORAL 4 TIMES DAILY PRN
Qty: 15 TABLET | Refills: 0 | Status: SHIPPED | OUTPATIENT
Start: 2024-01-23 | End: 2024-02-02

## 2024-01-23 RX ORDER — HYOSCYAMINE SULFATE 0.125 MG
125 TABLET ORAL EVERY 4 HOURS PRN
Qty: 20 TABLET | Refills: 0 | Status: SHIPPED | OUTPATIENT
Start: 2024-01-23 | End: 2024-02-22

## 2024-01-23 RX ADMIN — DIPHENOXYLATE HYDROCHLORIDE AND ATROPINE SULFATE 2 TABLET: .025; 2.5 TABLET ORAL at 10:01

## 2024-01-23 RX ADMIN — HYOSCYAMINE SULFATE 0.12 MG: 0.12 TABLET ORAL at 10:01

## 2024-01-23 NOTE — Clinical Note
"Austyn Tom" Charly was seen and treated in our emergency department on 1/23/2024.  He may return to work on 01/28/2024.       If you have any questions or concerns, please don't hesitate to call.      NIKITA Nelson RN    "

## 2024-01-24 NOTE — ED PROVIDER NOTES
Encounter Date: 1/23/2024       History     Chief Complaint   Patient presents with    Abdominal Cramping     Abd cramping with diarrhea x3 days, states it has happened before after eating meat for the first time in a while. Ate ham 3 days ago.     Patient reports abdominal cramping and loose bowel movements for the last 3 days; no fever, no blood in his stool, no headache, no body aches, no cough, no shortness of breath; patient states he ate ham and believes this caused his diarrhea    The history is provided by the patient.     Review of patient's allergies indicates:  No Known Allergies  Past Medical History:   Diagnosis Date    HTN (hypertension)     Hyperlipidemia     Obesity, unspecified      No past surgical history on file.  Family History   Problem Relation Age of Onset    Hypertension Mother     Diabetes Mother     Diabetes Brother      Social History     Tobacco Use    Smoking status: Every Day     Current packs/day: 1.00     Types: Cigarettes    Smokeless tobacco: Never   Substance Use Topics    Alcohol use: Not Currently    Drug use: Yes     Types: Marijuana     Review of Systems   Constitutional: Negative.    HENT: Negative.     Respiratory: Negative.     Cardiovascular: Negative.    Gastrointestinal: Negative.    Genitourinary: Negative.    Musculoskeletal: Negative.    Neurological: Negative.        Physical Exam     Initial Vitals [01/23/24 2140]   BP Pulse Resp Temp SpO2   (!) 154/112 73 18 99.9 °F (37.7 °C) 97 %      MAP       --         Physical Exam    Nursing note and vitals reviewed.  Constitutional: He appears well-developed and well-nourished.   HENT:   Head: Normocephalic and atraumatic.   Eyes: EOM are normal. Pupils are equal, round, and reactive to light.   Neck: Neck supple.   Normal range of motion.  Cardiovascular:  Normal rate, regular rhythm and normal heart sounds.           Pulmonary/Chest: Breath sounds normal.   Abdominal: Abdomen is soft. Bowel sounds are normal. He exhibits  no distension. There is no abdominal tenderness. There is no rebound and no guarding.   Musculoskeletal:         General: Normal range of motion.      Cervical back: Normal range of motion and neck supple.     Neurological: He is alert and oriented to person, place, and time. GCS score is 15. GCS eye subscore is 4. GCS verbal subscore is 5. GCS motor subscore is 6.   Skin: Skin is warm and dry.         ED Course   Procedures  Labs Reviewed   COVID/FLU A&B PCR - Abnormal; Notable for the following components:       Result Value    SARS-CoV-2 PCR Detected (*)     All other components within normal limits    Narrative:     The Xpert Xpress SARS-CoV-2/FLU/RSV plus is a rapid, multiplexed real-time PCR test intended for the simultaneous qualitative detection and differentiation of SARS-CoV-2, Influenza A, Influenza B, and respiratory syncytial virus (RSV) viral RNA in either nasopharyngeal swab or nasal swab specimens.                Imaging Results    None          Medications   diphenoxylate-atropine 2.5-0.025 mg per tablet 2 tablet (has no administration in time range)   hyoscyamine ODT 0.125 mg (has no administration in time range)     Medical Decision Making  Patient reports abdominal cramping and loose bowel movements for the last 3 days; no fever, no blood in his stool, no headache, no body aches, no cough, no shortness of breath; patient states he ate ham and believes this caused his diarrhea    DIFFERENTIAL DIAGNOSIS- food toxicity, viral gastroenteritis, COVID, flu    Amount and/or Complexity of Data Reviewed  Labs: ordered. Decision-making details documented in ED Course.    Risk  Prescription drug management.  Risk Details: Patient had positive COVID testing with minimal symptoms; he has not tried any medication over-the-counter for his diarrhea; he will be given Lomotil and Levsin in the emergency room and prescriptions will be sent to his pharmacy; he refused an injection of Bentyl; I encouraged adequate  "hydration and follow up with primary care physician in 1 week regarding blood pressure which is elevated               ED Course as of 01/23/24 2244   Tue Jan 23, 2024 2229 SARS-CoV2 (COVID-19) Qualitative PCR(!): Detected [DD]      ED Course User Index  [DD] Williams Shafer MD             Patient Vitals for the past 24 hrs:   BP Temp Pulse Resp SpO2 Height Weight   01/23/24 2202 (!) 143/104 -- 78 -- 95 % -- --   01/23/24 2200 (!) 140/101 -- 81 -- 95 % -- --   01/23/24 2140 (!) 154/112 99.9 °F (37.7 °C) 73 18 97 % 5' 8" (1.727 m) 95 kg (209 lb 8 oz)           The patient is resting comfortably and in no acute distress.   He states that his symptoms have improved after treatment in Emergency Department. I personally discussed his test results and treatment plan.  Gave strict ED precautions.  Specific conditions for return to the emergency department and importance of follow up with his primary care provided or the physician listed on the discharge instructions.  Patient voices understanding and agrees to the plan discussed. All patients' questions have been answered at this time.   He has remained hemodynamically stable throughout entire stay in ED and is stable for discharge home.        Clinical Impression:  Final diagnoses:  [R19.7] Diarrhea, unspecified type (Primary)  [U07.1] COVID-19 virus infection          ED Disposition Condition    Discharge Stable          ED Prescriptions       Medication Sig Dispense Start Date End Date Auth. Provider    diphenoxylate-atropine 2.5-0.025 mg (LOMOTIL) 2.5-0.025 mg per tablet Take 1 tablet by mouth 4 (four) times daily as needed for Diarrhea. 15 tablet 1/23/2024 2/2/2024 Williams Shafer MD    hyoscyamine (ANASPAZ,LEVSIN) 0.125 mg Tab Take 1 tablet (125 mcg total) by mouth every 4 (four) hours as needed. 20 tablet 1/23/2024 2/22/2024 Williams Shafer MD          Follow-up Information       Follow up With Specialties Details Why Contact Info    Wander Car, А[facility] " Family Medicine In 1 week  [address] [facility] [address]  [phone]      [facility] - Emergency Dept Emergency Medicine  As needed [address] [facility] [address] [phone]             Williams Shafer MD  01/23/24 6077

## 2024-02-26 ENCOUNTER — OFFICE VISIT (OUTPATIENT)
Dept: FAMILY MEDICINE | Facility: CLINIC | Age: 44
End: 2024-02-26
Payer: MEDICAID

## 2024-02-26 VITALS
HEART RATE: 87 BPM | TEMPERATURE: 98 F | DIASTOLIC BLOOD PRESSURE: 94 MMHG | OXYGEN SATURATION: 98 % | BODY MASS INDEX: 32.98 KG/M2 | SYSTOLIC BLOOD PRESSURE: 145 MMHG | WEIGHT: 216.88 LBS

## 2024-02-26 DIAGNOSIS — I10 HYPERTENSION, UNSPECIFIED TYPE: ICD-10-CM

## 2024-02-26 DIAGNOSIS — M54.2 CERVICAL PAIN (NECK): Primary | ICD-10-CM

## 2024-02-26 PROCEDURE — 3077F SYST BP >= 140 MM HG: CPT | Mod: CPTII,,, | Performed by: NURSE PRACTITIONER

## 2024-02-26 PROCEDURE — 99213 OFFICE O/P EST LOW 20 MIN: CPT | Mod: ,,, | Performed by: NURSE PRACTITIONER

## 2024-02-26 PROCEDURE — 3008F BODY MASS INDEX DOCD: CPT | Mod: CPTII,,, | Performed by: NURSE PRACTITIONER

## 2024-02-26 PROCEDURE — 1159F MED LIST DOCD IN RCRD: CPT | Mod: CPTII,,, | Performed by: NURSE PRACTITIONER

## 2024-02-26 PROCEDURE — 3080F DIAST BP >= 90 MM HG: CPT | Mod: CPTII,,, | Performed by: NURSE PRACTITIONER

## 2024-02-26 RX ORDER — METHOCARBAMOL 750 MG/1
750 TABLET, FILM COATED ORAL NIGHTLY PRN
Qty: 30 TABLET | Refills: 0 | Status: SHIPPED | OUTPATIENT
Start: 2024-02-26

## 2024-02-26 NOTE — PROGRESS NOTES
SUBJECTIVE:     History of Present Illness      Chief Complaint: Follow-up (Pain  in neck at times feels like a strain )    HPI:  Patient is a 43 y.o. year old male who presents to clinic for reoccurring neck pain.  Initial episodes started about a year ago.  Patient states feels like his neck has a strain is better after getting a massage.  Denies any numbness or tingling down the extremities.    Patient reports that he has not been compliant with blood pressure medicines states it has soon side effects prefers natural remedies for blood pressure control.  Denies headaches, dizziness blurred vision, chest pain.    Review of Systems:    Review of Systems    12 point review of systems conducted, negative except as stated in the history of present illness. See HPI for details.     Previous History    Wander Car, RENETTA  Review of patient's allergies indicates:  No Known Allergies    Past Medical History:   Diagnosis Date    HTN (hypertension)     Hyperlipidemia     Obesity, unspecified      Current Outpatient Medications   Medication Instructions    atorvastatin (LIPITOR) 10 mg, Oral, Daily    hydroCHLOROthiazide (HYDRODIURIL) 12.5 mg, Oral, Daily    hyoscyamine (ANASPAZ,LEVSIN) 125 mcg, Oral, Every 4 hours PRN    ibuprofen (ADVIL,MOTRIN) 800 mg, Oral, 3 times daily    meloxicam (MOBIC) 15 mg, Oral, Daily    methocarbamoL (ROBAXIN) 750 mg, Oral, Nightly PRN     History reviewed. No pertinent surgical history.  Family History   Problem Relation Age of Onset    Hypertension Mother     Diabetes Mother     Diabetes Brother        Social History     Tobacco Use    Smoking status: Every Day     Current packs/day: 1.00     Types: Cigarettes    Smokeless tobacco: Never   Substance Use Topics    Alcohol use: Not Currently    Drug use: Yes     Types: Marijuana        Health Maintenance      Health Maintenance   Topic Date Due    Hepatitis C Screening  Never done    TETANUS VACCINE  Never done    Lipid Panel  09/20/2028        OBJECTIVE:     Physical Exam      Vital Signs Reviewed   Visit Vitals  BP (!) 145/94   Pulse 87   Temp 98 °F (36.7 °C)   Wt 98.4 kg (216 lb 14.4 oz)   SpO2 98%   BMI 32.98 kg/m²       Physical Exam    Physical Exam:  General: Alert, well nourished, no acute distress, non-toxic appearing.   Eyes: Anicteric sclera, without conjunctival injection, normal lids, no purulent drainage, EOMs grossly intact.   Ears: No tragal tenderness. Tympanic membranes intact, pearly grey, without effusion or erythema and with a positive light reflex.   Mouth: Posterior pharynx without erythema. No exudate, ulcerations, or lesion. No tonsillar swelling.   Neck: Supple, full ROM, no rigidity, no cervical adenopathy.   Cardio: Normal rate and rhythm    Resp: Respirations even and unlabored, clear to auscultation bilaterally.   Abd: No ecchymosis or distension. Normal bowel sounds in all 4 quadrants. No tenderness to palpation. No rebound tenderness or guarding. No CVA tenderness.   Skin: No rashes or open lesions noted.   MSK: No swelling. No abrasions or signs of trauma. Ambulating without assistance.   Neuro: Alert,oriented No focal deficits noted. Facial expressions even.   Psych: Cooperative, Normal affect      Procedures    Procedures     Labs     Results for orders placed or performed during the hospital encounter of 01/23/24   COVID/FLU A&B PCR   Result Value Ref Range    Influenza A PCR Not Detected Not Detected    Influenza B PCR Not Detected Not Detected    SARS-CoV-2 PCR Detected (A) Not Detected, Negative       Chemistry:  Lab Results   Component Value Date     09/20/2023    K 3.8 09/20/2023    CHLORIDE 108 (H) 09/20/2023    BUN 7.7 (L) 09/20/2023    CREATININE 1.07 09/20/2023    EGFRNORACEVR >60 09/20/2023    GLUCOSE 90 09/20/2023    CALCIUM 9.6 09/20/2023    ALKPHOS 107 09/20/2023    LABPROT 7.6 09/20/2023    ALBUMIN 4.2 09/20/2023    BILIDIR 0.10 12/02/2017    IBILI 0.31 12/02/2017    AST 25 09/20/2023    ALT 20  09/20/2023    YTAQRIBD18BK 30.6 09/20/2023    TSH 1.047 09/20/2023    PSA 1.45 05/20/2022        Lab Results   Component Value Date    HGBA1C 5.1 09/20/2023        Hematology:  Lab Results   Component Value Date    WBC 8.75 09/20/2023    HGB 14.2 09/20/2023    HCT 45.9 09/20/2023     09/20/2023       Lipid Panel:  Lab Results   Component Value Date    CHOL 200 09/20/2023    HDL 43 09/20/2023    .00 (H) 09/20/2023    TRIG 82 09/20/2023    TOTALCHOLEST 5 09/20/2023        Urine:  Lab Results   Component Value Date    COLORUA Yellow 12/14/2022    APPEARANCEUA Clear 12/14/2022    SGUA 1.020 12/14/2022    PHUA 8.5 12/14/2022    PROTEINUA Negative 12/14/2022    GLUCOSEUA Negative 12/14/2022    KETONESUA 40 (A) 12/14/2022    BLOODUA Negative 12/14/2022    NITRITESUA Negative 12/14/2022    LEUKOCYTESUR Negative 12/14/2022    RBCUA None Seen 12/14/2022    WBCUA None Seen 12/14/2022    BACTERIA None Seen 12/14/2022         Assessment            ICD-10-CM ICD-9-CM   1. Cervical pain (neck)  M54.2 723.1   2. Hypertension, unspecified type  I10 401.9       Plan       1. Cervical pain (neck)  - methocarbamoL (ROBAXIN) 750 MG Tab; Take 1 tablet (750 mg total) by mouth nightly as needed (muscle pain).  Dispense: 30 tablet; Refill: 0  - Ambulatory referral/consult to Physical/Occupational Therapy; Future    2. Hypertension, unspecified type  Despite recommendation patient continues to use natural remedies states that he has no longer having any symptoms of hypertension including headache dizziness blurred vision.  Orders Placed This Encounter    Ambulatory referral/consult to Physical/Occupational Therapy    methocarbamoL (ROBAXIN) 750 MG Tab      Medication List with Changes/Refills   New Medications    METHOCARBAMOL (ROBAXIN) 750 MG TAB    Take 1 tablet (750 mg total) by mouth nightly as needed (muscle pain).   Current Medications    ATORVASTATIN (LIPITOR) 10 MG TABLET    Take 1 tablet (10 mg total) by mouth once  daily.    HYDROCHLOROTHIAZIDE (HYDRODIURIL) 12.5 MG TAB    Take 1 tablet (12.5 mg total) by mouth once daily.    HYOSCYAMINE (ANASPAZ,LEVSIN) 0.125 MG TAB    Take 1 tablet (125 mcg total) by mouth every 4 (four) hours as needed.    IBUPROFEN (ADVIL,MOTRIN) 800 MG TABLET    Take 1 tablet (800 mg total) by mouth 3 (three) times daily.    MELOXICAM (MOBIC) 15 MG TABLET    Take 1 tablet (15 mg total) by mouth once daily.       Follow up in about 4 weeks (around 3/25/2024) for Wellness- no labs .   Follow up in about 4 weeks (around 3/25/2024) for Wellness- no labs . In addition to their scheduled follow up, the patient has also been instructed to follow up on as needed basis.   No future appointments.

## 2024-03-12 DIAGNOSIS — Z12.5 SCREENING PSA (PROSTATE SPECIFIC ANTIGEN): ICD-10-CM

## 2024-03-12 DIAGNOSIS — Z00.00 WELLNESS EXAMINATION: Primary | ICD-10-CM

## 2024-03-12 DIAGNOSIS — E78.5 HYPERLIPIDEMIA, UNSPECIFIED HYPERLIPIDEMIA TYPE: ICD-10-CM

## 2024-03-12 DIAGNOSIS — I10 HYPERTENSION, UNSPECIFIED TYPE: ICD-10-CM

## 2024-09-10 ENCOUNTER — HOSPITAL ENCOUNTER (EMERGENCY)
Facility: HOSPITAL | Age: 44
Discharge: HOME | End: 2024-09-10
Attending: FAMILY MEDICINE

## 2024-09-10 VITALS
BODY MASS INDEX: 32.74 KG/M2 | DIASTOLIC BLOOD PRESSURE: 96 MMHG | TEMPERATURE: 98 F | HEART RATE: 72 BPM | RESPIRATION RATE: 18 BRPM | WEIGHT: 216 LBS | HEIGHT: 68 IN | SYSTOLIC BLOOD PRESSURE: 160 MMHG | OXYGEN SATURATION: 100 %

## 2024-09-10 DIAGNOSIS — J40 BRONCHITIS: Primary | ICD-10-CM

## 2024-09-10 PROCEDURE — 99284 EMERGENCY DEPT VISIT MOD MDM: CPT

## 2024-09-10 RX ORDER — PREDNISONE 20 MG/1
20 TABLET ORAL DAILY
Qty: 5 TABLET | Refills: 0 | Status: SHIPPED | OUTPATIENT
Start: 2024-09-10 | End: 2024-09-16 | Stop reason: ALTCHOICE

## 2024-09-10 RX ORDER — ALBUTEROL SULFATE 90 UG/1
2 INHALANT RESPIRATORY (INHALATION) EVERY 4 HOURS PRN
Qty: 8 G | Refills: 0 | Status: SHIPPED | OUTPATIENT
Start: 2024-09-10 | End: 2024-10-10

## 2024-09-10 NOTE — ED PROVIDER NOTES
Encounter Date: 9/10/2024       History     Chief Complaint   Patient presents with    Nasal Congestion     Nasal congestion & cough x 3 days --pt states he has mold in his house -refuses all swabs      44-year-old presents with some nasal congestion cough for 3 days no fevers no chills patient has a smoker vital signs stable on exam shows no respiratory distress does have some scattered rhonchi no wheezes no increased work of breathing noted patient refuses all swabs discharge patient home with a diagnosis of bronchitis instructed him to stop smoking take meds as prescribed follow up with the PCP as needed        Review of patient's allergies indicates:  No Known Allergies  Past Medical History:   Diagnosis Date    HTN (hypertension)     Hyperlipidemia     Obesity, unspecified      History reviewed. No pertinent surgical history.  Family History   Problem Relation Name Age of Onset    Hypertension Mother      Diabetes Mother      Diabetes Brother       Social History     Tobacco Use    Smoking status: Every Day     Current packs/day: 1.00     Types: Cigarettes    Smokeless tobacco: Never   Substance Use Topics    Alcohol use: Not Currently    Drug use: Yes     Types: Marijuana     Review of Systems   HENT:  Positive for congestion.    Respiratory:  Positive for cough.    All other systems reviewed and are negative.      Physical Exam     Initial Vitals [09/10/24 0828]   BP Pulse Resp Temp SpO2   (!) 160/96 72 18 98.4 °F (36.9 °C) 100 %      MAP       --         Physical Exam    Nursing note and vitals reviewed.  Constitutional: He appears well-developed and well-nourished. He is active.   HENT:   Head: Normocephalic and atraumatic.   Eyes: Conjunctivae, EOM and lids are normal. Pupils are equal, round, and reactive to light.   Neck: Trachea normal and phonation normal. Neck supple. No thyroid mass present.   Normal range of motion.  Cardiovascular:  Normal rate, regular rhythm, normal heart sounds and normal  pulses.           Pulmonary/Chest: He has rhonchi.   Abdominal: Abdomen is soft. Bowel sounds are normal.   Musculoskeletal:         General: Normal range of motion.      Cervical back: Normal range of motion and neck supple.     Neurological: He is alert and oriented to person, place, and time. He has normal strength and normal reflexes. GCS score is 15. GCS eye subscore is 4. GCS verbal subscore is 5. GCS motor subscore is 6.   Skin: Skin is warm and intact.   Psychiatric: He has a normal mood and affect. His speech is normal and behavior is normal. Judgment and thought content normal. Cognition and memory are normal.         ED Course   Procedures  Labs Reviewed - No data to display       Imaging Results    None          Medications - No data to display  Medical Decision Making  44-year-old presents with some nasal congestion cough for 3 days no fevers no chills patient has a smoker vital signs stable on exam shows no respiratory distress does have some scattered rhonchi no wheezes no increased work of breathing noted patient refuses all swabs discharge patient home with a diagnosis of bronchitis instructed him to stop smoking take meds as prescribed follow up with the PCP as needed          Amount and/or Complexity of Data Reviewed  Independent Historian: parent    Risk  Prescription drug management.  Risk Details:  Differential diagnosis COVID flu RSV pneumonia bronchitis                                      Clinical Impression:  Final diagnoses:  [J40] Bronchitis (Primary)          ED Disposition Condition    Discharge Stable          ED Prescriptions       Medication Sig Dispense Start Date End Date Auth. Provider    predniSONE (DELTASONE) 20 MG tablet Take 1 tablet (20 mg total) by mouth once daily. for 5 days 5 tablet 9/10/2024 9/15/2024 Alvaro Ortiz MD    albuterol (PROVENTIL/VENTOLIN HFA) 90 mcg/actuation inhaler Inhale 2 puffs into the lungs every 4 (four) hours as needed for Wheezing. Rescue 8 g  9/10/2024 10/10/2024 Alvaro Ortiz MD          Follow-up Information       Follow up With Specialties Details Why Contact Info    [facility] In 1 week  [address] [facility]  [place] [address]  [phone]               Alvaro Ortiz MD  09/10/24 0905

## 2024-09-16 ENCOUNTER — OFFICE VISIT (OUTPATIENT)
Dept: FAMILY MEDICINE | Facility: CLINIC | Age: 44
End: 2024-09-16

## 2024-09-16 VITALS
OXYGEN SATURATION: 98 % | BODY MASS INDEX: 34.19 KG/M2 | WEIGHT: 217.81 LBS | DIASTOLIC BLOOD PRESSURE: 86 MMHG | RESPIRATION RATE: 17 BRPM | HEIGHT: 67 IN | HEART RATE: 83 BPM | TEMPERATURE: 99 F | SYSTOLIC BLOOD PRESSURE: 134 MMHG

## 2024-09-16 DIAGNOSIS — Z72.0 TOBACCO USE: Primary | ICD-10-CM

## 2024-09-16 DIAGNOSIS — Z12.11 SCREENING FOR MALIGNANT NEOPLASM OF COLON: ICD-10-CM

## 2024-09-16 DIAGNOSIS — J40 BRONCHITIS: ICD-10-CM

## 2024-09-16 PROCEDURE — 99406 BEHAV CHNG SMOKING 3-10 MIN: CPT | Mod: ,,, | Performed by: NURSE PRACTITIONER

## 2024-09-16 PROCEDURE — 99213 OFFICE O/P EST LOW 20 MIN: CPT | Mod: 25,,, | Performed by: NURSE PRACTITIONER

## 2024-09-16 RX ORDER — BENZONATATE 200 MG/1
200 CAPSULE ORAL 3 TIMES DAILY PRN
Qty: 30 CAPSULE | Refills: 0 | Status: SHIPPED | OUTPATIENT
Start: 2024-09-16 | End: 2024-09-26

## 2024-09-16 NOTE — PROGRESS NOTES
SUBJECTIVE:     History of Present Illness      Chief Complaint: Follow-up (C/O cough, productive, yellow now white, hoarseness x 4 days.  No fever)    HPI:  Patient is a 44 y.o. year old male who presents to clinic for ED follow-up for bronchitis and cough and hoarseness.  Patient states his symptoms have improved local emergency room approximately 10 days ago treated with steroids.  He said he still has a nonproductive cough.    Patient also reports that he has not been taking his hypertension or cholesterol medicine.  States that he has been trying therapeutic lifestyle changes.  Cutting meat and right side of his diet.    Review of Systems:    Review of Systems    12 point review of systems conducted, negative except as stated in the history of present illness. See HPI for details.     Previous History    Wander Car, [facility]  Review of patient's allergies indicates:  No Known Allergies    Past Medical History:   Diagnosis Date    HTN (hypertension)     Hyperlipidemia     Obesity, unspecified      Current Outpatient Medications   Medication Instructions    albuterol (PROVENTIL/VENTOLIN HFA) 90 mcg/actuation inhaler 2 puffs, Inhalation, Every 4 hours PRN, Rescue    atorvastatin (LIPITOR) 10 mg, Oral, Daily    benzonatate (TESSALON) 200 mg, Oral, 3 times daily PRN    hydroCHLOROthiazide (HYDRODIURIL) 12.5 mg, Oral, Daily    hyoscyamine (ANASPAZ,LEVSIN) 125 mcg, Oral, Every 4 hours PRN    meloxicam (MOBIC) 15 mg, Oral, Daily    methocarbamoL (ROBAXIN) 750 mg, Oral, Nightly PRN     History reviewed. No pertinent surgical history.  Family History   Problem Relation Name Age of Onset    Hypertension Mother      Diabetes Mother      Diabetes Brother         Social History     Tobacco Use    Smoking status: Every Day     Current packs/day: 1.00     Types: Cigarettes    Smokeless tobacco: Never   Substance Use Topics    Alcohol use: Not Currently    Drug use: Yes     Types: Marijuana        Health Maintenance   "    Health Maintenance   Topic Date Due    Hepatitis C Screening  Never done    TETANUS VACCINE  Never done    Colorectal Cancer Screening  Never done    Lipid Panel  09/20/2028       OBJECTIVE:     Physical Exam      Vital Signs Reviewed   Visit Vitals  /86 (BP Location: Left arm, Patient Position: Sitting)   Pulse 83   Temp 99.1 °F (37.3 °C) (Oral)   Resp 17   Ht 5' 7" (1.702 m)   Wt 98.8 kg (217 lb 12.8 oz)   SpO2 98%   BMI 34.11 kg/m²       Physical Exam    Physical Exam:  General: Alert, well nourished, no acute distress, non-toxic appearing.   Eyes: Anicteric sclera, without conjunctival injection, normal lids, no purulent drainage, EOMs grossly intact.   Ears: No tragal tenderness. Tympanic membranes intact, pearly grey, without effusion or erythema and with a positive light reflex.   Mouth: Posterior pharynx without erythema. No exudate, ulcerations, or lesion. No tonsillar swelling.   Neck: Supple, full ROM, no rigidity, no cervical adenopathy.   Cardio: Normal rate and rhythm    Resp: Respirations even and unlabored, clear to auscultation bilaterally.   Abd: No ecchymosis or distension. Normal bowel sounds in all 4 quadrants. No tenderness to palpation. No rebound tenderness or guarding. No CVA tenderness.   Skin: No rashes or open lesions noted.   MSK: No swelling. No abrasions or signs of trauma. Ambulating without assistance.   Neuro: Alert,oriented No focal deficits noted. Facial expressions even.   Psych: Cooperative, Normal affect      Procedures    Procedures     Labs     Results for orders placed or performed during the hospital encounter of 01/23/24   COVID/FLU A&B PCR   Result Value Ref Range    Influenza A PCR Not Detected Not Detected    Influenza B PCR Not Detected Not Detected    SARS-CoV-2 PCR Detected (A) Not Detected, Negative       Chemistry:  Lab Results   Component Value Date     09/20/2023    K 3.8 09/20/2023    BUN 7.7 (L) 09/20/2023    CREATININE 1.07 09/20/2023    " EGFRNORACEVR >60 09/20/2023    GLUCOSE 90 09/20/2023    CALCIUM 9.6 09/20/2023    ALKPHOS 107 09/20/2023    LABPROT 7.6 09/20/2023    ALBUMIN 4.2 09/20/2023    BILIDIR 0.10 12/02/2017    IBILI 0.31 12/02/2017    AST 25 09/20/2023    ALT 20 09/20/2023    DVGTZBTX86XX 30.6 09/20/2023    TSH 1.047 09/20/2023    PSA 1.45 05/20/2022        Lab Results   Component Value Date    HGBA1C 5.1 09/20/2023        Hematology:  Lab Results   Component Value Date    WBC 8.75 09/20/2023    HGB 14.2 09/20/2023    HCT 45.9 09/20/2023     09/20/2023       Lipid Panel:  Lab Results   Component Value Date    CHOL 200 09/20/2023    HDL 43 09/20/2023    .00 (H) 09/20/2023    TRIG 82 09/20/2023    TOTALCHOLEST 5 09/20/2023        Urine:  Lab Results   Component Value Date    APPEARANCEUA Clear 12/14/2022    SGUA 1.020 12/14/2022    PROTEINUA Negative 12/14/2022    KETONESUA 40 (A) 12/14/2022    LEUKOCYTESUR Negative 12/14/2022    RBCUA None Seen 12/14/2022    WBCUA None Seen 12/14/2022    BACTERIA None Seen 12/14/2022         Assessment            ICD-10-CM ICD-9-CM   1. Tobacco use  Z72.0 305.1   2. Bronchitis  J40 490   3. Screening for malignant neoplasm of colon  Z12.11 V76.51       Plan       1. Tobacco use  The patient was counseled for 3 minute on the dangers of tobacco use, and was advised to quit.  Reviewed strategies to maximize success, including written materials.    2. Bronchitis  - benzonatate (TESSALON) 200 MG capsule; Take 1 capsule (200 mg total) by mouth 3 (three) times daily as needed for Cough.  Dispense: 30 capsule; Refill: 0    3. Screening for malignant neoplasm of colon  - Ambulatory referral/consult to General Surgery; Future    Orders Placed This Encounter    Ambulatory referral/consult to General Surgery    benzonatate (TESSALON) 200 MG capsule      Medication List with Changes/Refills   New Medications    BENZONATATE (TESSALON) 200 MG CAPSULE    Take 1 capsule (200 mg total) by mouth 3 (three) times  daily as needed for Cough.   Current Medications    ALBUTEROL (PROVENTIL/VENTOLIN HFA) 90 MCG/ACTUATION INHALER    Inhale 2 puffs into the lungs every 4 (four) hours as needed for Wheezing. Rescue    ATORVASTATIN (LIPITOR) 10 MG TABLET    Take 1 tablet (10 mg total) by mouth once daily.    HYDROCHLOROTHIAZIDE (HYDRODIURIL) 12.5 MG TAB    Take 1 tablet (12.5 mg total) by mouth once daily.    HYOSCYAMINE (ANASPAZ,LEVSIN) 0.125 MG TAB    Take 1 tablet (125 mcg total) by mouth every 4 (four) hours as needed.    MELOXICAM (MOBIC) 15 MG TABLET    Take 1 tablet (15 mg total) by mouth once daily.    METHOCARBAMOL (ROBAXIN) 750 MG TAB    Take 1 tablet (750 mg total) by mouth nightly as needed (muscle pain).   Discontinued Medications    IBUPROFEN (ADVIL,MOTRIN) 800 MG TABLET    Take 1 tablet (800 mg total) by mouth 3 (three) times daily.       Follow up in about 6 weeks (around 10/28/2024) for Wellness, LABS Prior.   Follow up in about 6 weeks (around 10/28/2024) for Wellness, LABS Prior. In addition to their scheduled follow up, the patient has also been instructed to follow up on as needed basis.   Future Appointments   Date Time Provider Department Center   10/30/2024  9:00 AM Wander Car, [facility]

## 2024-10-16 ENCOUNTER — HOSPITAL ENCOUNTER (EMERGENCY)
Facility: HOSPITAL | Age: 44
Discharge: LEFT BEFORE DISCHARGE | End: 2024-10-16
Attending: EMERGENCY MEDICINE
Payer: MEDICAID

## 2024-10-16 VITALS
HEIGHT: 69 IN | HEART RATE: 75 BPM | RESPIRATION RATE: 21 BRPM | TEMPERATURE: 98 F | SYSTOLIC BLOOD PRESSURE: 175 MMHG | BODY MASS INDEX: 31.69 KG/M2 | WEIGHT: 213.94 LBS | DIASTOLIC BLOOD PRESSURE: 116 MMHG | OXYGEN SATURATION: 100 %

## 2024-10-16 DIAGNOSIS — I10 UNCONTROLLED HYPERTENSION: ICD-10-CM

## 2024-10-16 PROCEDURE — 93005 ELECTROCARDIOGRAM TRACING: CPT

## 2024-10-16 PROCEDURE — 99281 EMR DPT VST MAYX REQ PHY/QHP: CPT | Mod: 25

## 2024-10-16 NOTE — ED PROVIDER NOTES
ED PROVIDER NOTE  10/16/2024    CHIEF COMPLAINT:   Chief Complaint   Patient presents with    Hypertension     Presents via PD for Medical Clearance for HTN. /117 in triage.       HISTORY OF PRESENT ILLNESS:   Austyn Parks is a 44 y.o. male who presents with chief complaint Medical clearance for incarceration.  Presents via law enforcement for medical clearance for incarceration due to having high blood pressure when being booked into [facility].  Patient reports history of hypertension but states he was not take his medication.  Denies chest pain, shortness of breath, headache, decreased urination.    The history is provided by the patient.         REVIEW OF SYSTEMS: as noted in the HPI.  NURSING NOTES REVIEWED      PAST MEDICAL/SURGICAL HISTORY:   Past Medical History:   Diagnosis Date    HTN (hypertension)     Hyperlipidemia     Obesity, unspecified     History reviewed. No pertinent surgical history.    FAMILY HISTORY:   Family History   Problem Relation Name Age of Onset    Hypertension Mother      Diabetes Mother      Diabetes Brother         SOCIAL HISTORY:   Social History     Tobacco Use    Smoking status: Every Day     Current packs/day: 1.00     Types: Cigarettes    Smokeless tobacco: Never   Substance Use Topics    Alcohol use: Not Currently    Drug use: Yes     Types: Marijuana       ALLERGIES: Review of patient's allergies indicates:  No Known Allergies    PHYSICAL EXAM:  Initial Vitals [10/16/24 0813]   BP Pulse Resp Temp SpO2   (!) 186/117 69 18 97.6 °F (36.4 °C) 100 %      MAP       --         Physical Exam    Nursing note and vitals reviewed.  Constitutional: He appears well-developed and well-nourished. No distress.   HENT:   Head: Normocephalic and atraumatic.   Nose: Nose normal. Mouth/Throat: Oropharynx is clear and moist and mucous membranes are normal.   Eyes: Conjunctivae and EOM are normal. Pupils are equal, round, and reactive to light.   Neck: Neck supple. No tracheal deviation present.    Cardiovascular:  Normal rate, regular rhythm, normal heart sounds, intact distal pulses and normal pulses.           Pulmonary/Chest: Effort normal and breath sounds normal. No respiratory distress.   Abdominal: Abdomen is soft. There is no abdominal tenderness. There is no rebound and no guarding.   Musculoskeletal:         General: Normal range of motion.      Cervical back: Neck supple.     Neurological: He is alert and oriented to person, place, and time. GCS eye subscore is 4. GCS verbal subscore is 5. GCS motor subscore is 6.   CN II-XII intact. Moves all extremities. No gross sensory or motor deficits.   Skin: Skin is warm, dry and intact.   Psychiatric: He has a normal mood and affect. His speech is normal and behavior is normal. Judgment and thought content normal. Cognition and memory are normal.         RESULTS:  Labs Reviewed   B-TYPE NATRIURETIC PEPTIDE   CBC W/ AUTO DIFFERENTIAL    Narrative:     The following orders were created for panel order CBC auto differential.  Procedure                               Abnormality         Status                     ---------                               -----------         ------                     CBC with Differential[[id number]]                                                        Please view results for these tests on the individual orders.   BASIC METABOLIC PANEL   TROPONIN I   CBC WITH DIFFERENTIAL     Imaging Results    None         PROCEDURES:  Procedures    ECG:  EKG Readings: (Independently Interpreted)   Initial Reading: No STEMI. Rhythm: Normal Sinus Rhythm. Heart Rate: 77. Ectopy: No Ectopy. Conduction: Normal. Axis: Normal.       ED COURSE AND MEDICAL DECISION MAKING:  Medications - No data to display        Medical Decision Making  Austyn Parks has decided to leave our facility against medical advice. I have assessed the patient's ability to make an informed decision and it is my opinion at this time that the patient has the medical  decision-making capacity to comprehend information regarding current medical condition and appreciates the impact of the disease or condition and the consequences of various options for treatment, including foregoing treatment. The patient possesses the ability to evaluate all treatment options, compare the risks and benefits of each option, communicate choice in a consistent manner over time, and is able to make rational choices. I have explained to the patient further testing, treatment, and evaluation I would like to perform during the current emergency department visit as well as any possible alternatives that could be accomplished in a timely manner. I have outlined the possible risks of foregoing any or all of these interventions and the patient understands and acknowledges that the decision to leave may result in undesirable consequences such as death, permanent disability, and/or loss of current lifestyle. Even though leaving AMA is not ideal, I have instructed the patient to follow any discharge instructions given, take any medications prescribed, and resume care as soon as possible with another provider. Additionally, we clearly stated that the patient is welcome to return at any time to continue care at our facility.      Amount and/or Complexity of Data Reviewed  Labs: ordered.  ECG/medicine tests: ordered and independent interpretation performed.    Risk  Prescription drug management.  Diagnosis or treatment significantly limited by social determinants of health.        CLINICAL IMPRESSION:  1. Uncontrolled hypertension        DISPOSITION:   ED Disposition Condition    AMA Stable                    Maximo Saez,   10/16/24 0839

## 2024-10-22 LAB
OHS QRS DURATION: 84 MS
OHS QTC CALCULATION: 441 MS

## 2024-10-23 DIAGNOSIS — I10 HYPERTENSION, UNSPECIFIED TYPE: Primary | ICD-10-CM

## 2024-10-23 DIAGNOSIS — E78.5 HYPERLIPIDEMIA, UNSPECIFIED HYPERLIPIDEMIA TYPE: ICD-10-CM

## 2024-10-23 DIAGNOSIS — Z00.00 WELLNESS EXAMINATION: ICD-10-CM

## 2024-10-23 DIAGNOSIS — Z12.5 SCREENING PSA (PROSTATE SPECIFIC ANTIGEN): ICD-10-CM

## 2024-10-28 ENCOUNTER — LAB VISIT (OUTPATIENT)
Dept: LAB | Facility: HOSPITAL | Age: 44
End: 2024-10-28
Attending: NURSE PRACTITIONER
Payer: MEDICAID

## 2024-10-28 DIAGNOSIS — E78.5 HYPERLIPIDEMIA, UNSPECIFIED HYPERLIPIDEMIA TYPE: ICD-10-CM

## 2024-10-28 DIAGNOSIS — Z00.00 WELLNESS EXAMINATION: ICD-10-CM

## 2024-10-28 DIAGNOSIS — Z12.5 SCREENING PSA (PROSTATE SPECIFIC ANTIGEN): ICD-10-CM

## 2024-10-28 DIAGNOSIS — I10 HYPERTENSION, UNSPECIFIED TYPE: ICD-10-CM

## 2024-10-28 LAB
25(OH)D3+25(OH)D2 SERPL-MCNC: 28 NG/ML (ref 30–80)
ALBUMIN SERPL-MCNC: 4 G/DL (ref 3.5–5)
ALBUMIN/GLOB SERPL: 1.1 RATIO (ref 1.1–2)
ALP SERPL-CCNC: 96 UNIT/L (ref 40–150)
ALT SERPL-CCNC: 17 UNIT/L (ref 0–55)
ANION GAP SERPL CALC-SCNC: 8 MEQ/L
AST SERPL-CCNC: 24 UNIT/L (ref 5–34)
BASOPHILS # BLD AUTO: 0.03 X10(3)/MCL
BASOPHILS NFR BLD AUTO: 0.3 %
BILIRUB SERPL-MCNC: 0.8 MG/DL
BUN SERPL-MCNC: 9.4 MG/DL (ref 8.9–20.6)
CALCIUM SERPL-MCNC: 9.7 MG/DL (ref 8.4–10.2)
CHLORIDE SERPL-SCNC: 108 MMOL/L (ref 98–107)
CHOLEST SERPL-MCNC: 200 MG/DL
CHOLEST/HDLC SERPL: 6 {RATIO} (ref 0–5)
CO2 SERPL-SCNC: 28 MMOL/L (ref 22–29)
CREAT SERPL-MCNC: 1.08 MG/DL (ref 0.72–1.25)
CREAT/UREA NIT SERPL: 9
EOSINOPHIL # BLD AUTO: 0.35 X10(3)/MCL (ref 0–0.9)
EOSINOPHIL NFR BLD AUTO: 3.5 %
ERYTHROCYTE [DISTWIDTH] IN BLOOD BY AUTOMATED COUNT: 14.1 % (ref 11.5–17)
EST. AVERAGE GLUCOSE BLD GHB EST-MCNC: 105.4 MG/DL
GFR SERPLBLD CREATININE-BSD FMLA CKD-EPI: >60 ML/MIN/1.73/M2
GLOBULIN SER-MCNC: 3.5 GM/DL (ref 2.4–3.5)
GLUCOSE SERPL-MCNC: 105 MG/DL (ref 74–100)
HBA1C MFR BLD: 5.3 %
HCT VFR BLD AUTO: 45.4 % (ref 42–52)
HCV AB SERPL QL IA: NONREACTIVE
HDLC SERPL-MCNC: 33 MG/DL (ref 35–60)
HGB BLD-MCNC: 14.6 G/DL (ref 14–18)
HIV 1+2 AB+HIV1 P24 AG SERPL QL IA: NONREACTIVE
IMM GRANULOCYTES # BLD AUTO: 0.02 X10(3)/MCL (ref 0–0.04)
IMM GRANULOCYTES NFR BLD AUTO: 0.2 %
LDLC SERPL CALC-MCNC: 131 MG/DL (ref 50–140)
LYMPHOCYTES # BLD AUTO: 3.27 X10(3)/MCL (ref 0.6–4.6)
LYMPHOCYTES NFR BLD AUTO: 32.4 %
MCH RBC QN AUTO: 28.2 PG (ref 27–31)
MCHC RBC AUTO-ENTMCNC: 32.2 G/DL (ref 33–36)
MCV RBC AUTO: 87.8 FL (ref 80–94)
MONOCYTES # BLD AUTO: 0.63 X10(3)/MCL (ref 0.1–1.3)
MONOCYTES NFR BLD AUTO: 6.3 %
NEUTROPHILS # BLD AUTO: 5.78 X10(3)/MCL (ref 2.1–9.2)
NEUTROPHILS NFR BLD AUTO: 57.3 %
PLATELET # BLD AUTO: 307 X10(3)/MCL (ref 130–400)
PMV BLD AUTO: 10.1 FL (ref 7.4–10.4)
POTASSIUM SERPL-SCNC: 4.1 MMOL/L (ref 3.5–5.1)
PROT SERPL-MCNC: 7.5 GM/DL (ref 6.4–8.3)
PSA SERPL-MCNC: 1.41 NG/ML
RBC # BLD AUTO: 5.17 X10(6)/MCL (ref 4.7–6.1)
SODIUM SERPL-SCNC: 144 MMOL/L (ref 136–145)
TRIGL SERPL-MCNC: 182 MG/DL (ref 34–140)
TSH SERPL-ACNC: 2.28 UIU/ML (ref 0.35–4.94)
VLDLC SERPL CALC-MCNC: 36 MG/DL
WBC # BLD AUTO: 10.08 X10(3)/MCL (ref 4.5–11.5)

## 2024-10-28 PROCEDURE — 80061 LIPID PANEL: CPT

## 2024-10-28 PROCEDURE — 87389 HIV-1 AG W/HIV-1&-2 AB AG IA: CPT

## 2024-10-28 PROCEDURE — 84443 ASSAY THYROID STIM HORMONE: CPT

## 2024-10-28 PROCEDURE — 85025 COMPLETE CBC W/AUTO DIFF WBC: CPT

## 2024-10-28 PROCEDURE — 86803 HEPATITIS C AB TEST: CPT

## 2024-10-28 PROCEDURE — 83036 HEMOGLOBIN GLYCOSYLATED A1C: CPT

## 2024-10-28 PROCEDURE — 84153 ASSAY OF PSA TOTAL: CPT

## 2024-10-28 PROCEDURE — 80053 COMPREHEN METABOLIC PANEL: CPT

## 2024-10-28 PROCEDURE — 36415 COLL VENOUS BLD VENIPUNCTURE: CPT

## 2024-10-28 PROCEDURE — 82306 VITAMIN D 25 HYDROXY: CPT

## 2024-10-30 ENCOUNTER — OFFICE VISIT (OUTPATIENT)
Dept: FAMILY MEDICINE | Facility: CLINIC | Age: 44
End: 2024-10-30
Payer: MEDICAID

## 2024-10-30 VITALS
DIASTOLIC BLOOD PRESSURE: 82 MMHG | TEMPERATURE: 98 F | HEIGHT: 68 IN | SYSTOLIC BLOOD PRESSURE: 134 MMHG | OXYGEN SATURATION: 99 % | HEART RATE: 110 BPM | BODY MASS INDEX: 31.86 KG/M2 | RESPIRATION RATE: 16 BRPM | WEIGHT: 210.19 LBS

## 2024-10-30 DIAGNOSIS — Z72.0 TOBACCO USE: ICD-10-CM

## 2024-10-30 DIAGNOSIS — Z12.11 SCREENING FOR MALIGNANT NEOPLASM OF COLON: ICD-10-CM

## 2024-10-30 DIAGNOSIS — Z00.00 WELLNESS EXAMINATION: Primary | ICD-10-CM

## 2024-10-30 PROCEDURE — 3079F DIAST BP 80-89 MM HG: CPT | Mod: CPTII,,, | Performed by: NURSE PRACTITIONER

## 2024-10-30 PROCEDURE — 3008F BODY MASS INDEX DOCD: CPT | Mod: CPTII,,, | Performed by: NURSE PRACTITIONER

## 2024-10-30 PROCEDURE — 99406 BEHAV CHNG SMOKING 3-10 MIN: CPT | Mod: ,,, | Performed by: NURSE PRACTITIONER

## 2024-10-30 PROCEDURE — 1159F MED LIST DOCD IN RCRD: CPT | Mod: CPTII,,, | Performed by: NURSE PRACTITIONER

## 2024-10-30 PROCEDURE — 99396 PREV VISIT EST AGE 40-64: CPT | Mod: 25,,, | Performed by: NURSE PRACTITIONER

## 2024-10-30 PROCEDURE — 3044F HG A1C LEVEL LT 7.0%: CPT | Mod: CPTII,,, | Performed by: NURSE PRACTITIONER

## 2024-10-30 PROCEDURE — 3075F SYST BP GE 130 - 139MM HG: CPT | Mod: CPTII,,, | Performed by: NURSE PRACTITIONER

## 2024-10-30 PROCEDURE — 1160F RVW MEDS BY RX/DR IN RCRD: CPT | Mod: CPTII,,, | Performed by: NURSE PRACTITIONER

## 2024-10-30 NOTE — PROGRESS NOTES
"SUBJECTIVE:     History of Present Illness      Chief Complaint: Annual Exam (Here for wellness exam, discuss lab results.  No complaints at this time)    HPI:  Patient is a 44 y.o. year old male who presents to clinic annual wellness and lab review.  The patient's general health status is described as good.  History of hypertension been well controlled without medication.  Patient is due for colon cancer screening we will reschedule him with Dr. Gomez    Review of Systems:    Review of Systems    12 point review of systems conducted, negative except as stated in the history of present illness. See HPI for details.     Previous History    Wander Car, RENETTA  Review of patient's allergies indicates:  No Known Allergies    Past Medical History:   Diagnosis Date    HTN (hypertension)     Hyperlipidemia     Obesity, unspecified      Current Outpatient Medications   Medication Instructions    albuterol (PROVENTIL/VENTOLIN HFA) 90 mcg/actuation inhaler 2 puffs, Inhalation, Every 4 hours PRN, Rescue     History reviewed. No pertinent surgical history.  Family History   Problem Relation Name Age of Onset    Hypertension Mother      Diabetes Mother      Diabetes Brother       Social History     Tobacco Use    Smoking status: Every Day     Current packs/day: 1.00     Types: Cigarettes    Smokeless tobacco: Never   Substance Use Topics    Alcohol use: Not Currently    Drug use: Yes     Types: Marijuana        Health Maintenance      Health Maintenance   Topic Date Due    TETANUS VACCINE  Never done    Colorectal Cancer Screening  Never done    Lipid Panel  10/28/2029    Hepatitis C Screening  Completed       OBJECTIVE:     Physical Exam      Vital Signs Reviewed   Visit Vitals  /82 (BP Location: Left arm, Patient Position: Sitting)   Pulse 110   Temp 98.3 °F (36.8 °C) (Oral)   Resp 16   Ht 5' 8" (1.727 m)   Wt 95.3 kg (210 lb 3.2 oz)   SpO2 99%   BMI 31.96 kg/m²       Physical Exam    Physical Exam:  General: " Alert, well nourished, no acute distress, non-toxic appearing.   Eyes: Anicteric sclera, without conjunctival injection, normal lids, no purulent drainage, EOMs grossly intact.   Ears: No tragal tenderness. Tympanic membranes intact, pearly grey, without effusion or erythema and with a positive light reflex.   Mouth: Posterior pharynx without erythema. No exudate, ulcerations, or lesion. No tonsillar swelling.   Neck: Supple, full ROM, no rigidity, no cervical adenopathy.   Cardio: Normal rate and rhythm    Resp: Respirations even and unlabored, clear to auscultation bilaterally.   Abd: No ecchymosis or distension. Normal bowel sounds in all 4 quadrants. No tenderness to palpation. No rebound tenderness or guarding. No CVA tenderness.   Skin: No rashes or open lesions noted.   MSK: No swelling. No abrasions or signs of trauma. Ambulating without assistance.   Neuro: Alert,oriented No focal deficits noted. Facial expressions even.   Psych: Cooperative, Normal affect      Labs     Results for orders placed or performed in visit on 10/28/24   HIV 1/2 Ag/Ab (4th Gen)    Collection Time: 10/28/24  1:23 PM   Result Value Ref Range    HIV Nonreactive Nonreactive   Hepatitis C Antibody    Collection Time: 10/28/24  1:23 PM   Result Value Ref Range    Hep C Ab Interp Nonreactive Nonreactive   PSA, Screening    Collection Time: 10/28/24  1:23 PM   Result Value Ref Range    Prostate Specific Antigen 1.41 <=4.00 ng/mL   Comprehensive Metabolic Panel    Collection Time: 10/28/24  1:23 PM   Result Value Ref Range    Sodium 144 136 - 145 mmol/L    Potassium 4.1 3.5 - 5.1 mmol/L    Chloride 108 (H) 98 - 107 mmol/L    CO2 28 22 - 29 mmol/L    Glucose 105 (H) 74 - 100 mg/dL    Blood Urea Nitrogen 9.4 8.9 - 20.6 mg/dL    Creatinine 1.08 0.72 - 1.25 mg/dL    Calcium 9.7 8.4 - 10.2 mg/dL    Protein Total 7.5 6.4 - 8.3 gm/dL    Albumin 4.0 3.5 - 5.0 g/dL    Globulin 3.5 2.4 - 3.5 gm/dL    Albumin/Globulin Ratio 1.1 1.1 - 2.0 ratio     Bilirubin Total 0.8 <=1.5 mg/dL    ALP 96 40 - 150 unit/L    ALT 17 0 - 55 unit/L    AST 24 5 - 34 unit/L    eGFR >60 mL/min/1.73/m2    Anion Gap 8.0 mEq/L    BUN/Creatinine Ratio 9    Lipid Panel    Collection Time: 10/28/24  1:23 PM   Result Value Ref Range    Cholesterol Total 200 <=200 mg/dL    HDL Cholesterol 33 (L) 35 - 60 mg/dL    Triglyceride 182 (H) 34 - 140 mg/dL    Cholesterol/HDL Ratio 6 (H) 0 - 5    Very Low Density Lipoprotein 36     LDL Cholesterol 131.00 50.00 - 140.00 mg/dL   TSH    Collection Time: 10/28/24  1:23 PM   Result Value Ref Range    TSH 2.281 0.350 - 4.940 uIU/mL   Hemoglobin A1C    Collection Time: 10/28/24  1:23 PM   Result Value Ref Range    Hemoglobin A1c 5.3 <=7.0 %    Estimated Average Glucose 105.4 mg/dL   Vitamin D    Collection Time: 10/28/24  1:23 PM   Result Value Ref Range    Vitamin D 28 (L) 30 - 80 ng/mL   CBC with Differential    Collection Time: 10/28/24  1:23 PM   Result Value Ref Range    WBC 10.08 4.50 - 11.50 x10(3)/mcL    RBC 5.17 4.70 - 6.10 x10(6)/mcL    Hgb 14.6 14.0 - 18.0 g/dL    Hct 45.4 42.0 - 52.0 %    MCV 87.8 80.0 - 94.0 fL    MCH 28.2 27.0 - 31.0 pg    MCHC 32.2 (L) 33.0 - 36.0 g/dL    RDW 14.1 11.5 - 17.0 %    Platelet 307 130 - 400 x10(3)/mcL    MPV 10.1 7.4 - 10.4 fL    Neut % 57.3 %    Lymph % 32.4 %    Mono % 6.3 %    Eos % 3.5 %    Basophil % 0.3 %    Lymph # 3.27 0.6 - 4.6 x10(3)/mcL    Neut # 5.78 2.1 - 9.2 x10(3)/mcL    Mono # 0.63 0.1 - 1.3 x10(3)/mcL    Eos # 0.35 0 - 0.9 x10(3)/mcL    Baso # 0.03 <=0.2 x10(3)/mcL    IG# 0.02 0 - 0.04 x10(3)/mcL    IG% 0.2 %       Chemistry:  Lab Results   Component Value Date     10/28/2024    K 4.1 10/28/2024    BUN 9.4 10/28/2024    CREATININE 1.08 10/28/2024    EGFRNORACEVR >60 10/28/2024    GLUCOSE 105 (H) 10/28/2024    CALCIUM 9.7 10/28/2024    ALKPHOS 96 10/28/2024    LABPROT 7.5 10/28/2024    ALBUMIN 4.0 10/28/2024    BILIDIR 0.10 12/02/2017    IBILI 0.31 12/02/2017    AST 24 10/28/2024    ALT 17  10/28/2024    [id number] 28 (L) 10/28/2024    TSH 2.281 10/28/2024    PSA 1.41 10/28/2024        Lab Results   Component Value Date    HGBA1C 5.3 10/28/2024        Hematology:  Lab Results   Component Value Date    WBC 10.08 10/28/2024    HGB 14.6 10/28/2024    HCT 45.4 10/28/2024     10/28/2024       Lipid Panel:  Lab Results   Component Value Date    CHOL 200 10/28/2024    HDL 33 (L) 10/28/2024    .00 10/28/2024    TRIG 182 (H) 10/28/2024    TOTALCHOLEST 6 (H) 10/28/2024        Urine:  Lab Results   Component Value Date    APPEARANCEUA Clear 12/14/2022    SGUA 1.020 12/14/2022    PROTEINUA Negative 12/14/2022    KETONESUA 40 (A) 12/14/2022    LEUKOCYTESUR Negative 12/14/2022    RBCUA None Seen 12/14/2022    WBCUA None Seen 12/14/2022    BACTERIA None Seen 12/14/2022         Assessment            ICD-10-CM ICD-9-CM   1. Wellness examination  Z00.00 V70.0   2. Screening for malignant neoplasm of colon  Z12.11 V76.51   3. Tobacco use  Z72.0 305.1       Plan       1. Wellness examination  Discussed labs and preventative screenings   Overall health status reviewed.    Significant chronic conditions addressed, including ongoing treatment plans.   Good health habits reinforced.    Cardiovascular disease risk factors discussed.   Appropriate recommendations and preventative care medical   information provided with annual wellness exams encouraged.  Vaccination status   Colon referral placed    2. Screening for malignant neoplasm of colon  Referral was placed to Dr. SHERRON Steele.  Patient rescheduled for colonoscopy  3. Tobacco use  The patient was counseled for 3 minute on the dangers of tobacco use, and was advised to quit.  Reviewed strategies to maximize success, including written materials.        Medication List with Changes/Refills   Current Medications    ALBUTEROL (PROVENTIL/VENTOLIN HFA) 90 MCG/ACTUATION INHALER    Inhale 2 puffs into the lungs every 4 (four) hours as needed for Wheezing. Rescue    Discontinued Medications    ATORVASTATIN (LIPITOR) 10 MG TABLET    Take 1 tablet (10 mg total) by mouth once daily.    HYDROCHLOROTHIAZIDE (HYDRODIURIL) 12.5 MG TAB    Take 1 tablet (12.5 mg total) by mouth once daily.       Follow up in about 3 months (around 1/30/2025), or if symptoms worsen or fail to improve.   Follow up in about 3 months (around 1/30/2025), or if symptoms worsen or fail to improve. In addition to their scheduled follow up, the patient has also been instructed to follow up on as needed basis.   Future Appointments   Date Time Provider Department Center   1/30/2025  9:45 AM Wander Car FNP [facility]   11/5/2025  9:00 AM Wander Car FNP [facility]

## 2025-01-17 ENCOUNTER — HOSPITAL ENCOUNTER (EMERGENCY)
Facility: HOSPITAL | Age: 45
Discharge: HOME | End: 2025-01-18
Attending: STUDENT IN AN ORGANIZED HEALTH CARE EDUCATION/TRAINING PROGRAM
Payer: MEDICAID

## 2025-01-17 DIAGNOSIS — K08.89 PAIN, DENTAL: Primary | ICD-10-CM

## 2025-01-17 DIAGNOSIS — K08.89 TOOTHACHE: ICD-10-CM

## 2025-01-17 DIAGNOSIS — K08.89 DENTALGIA: ICD-10-CM

## 2025-01-17 PROCEDURE — 99284 EMERGENCY DEPT VISIT MOD MDM: CPT

## 2025-01-17 RX ORDER — HYDROCODONE BITARTRATE AND ACETAMINOPHEN 7.5; 325 MG/1; MG/1
1 TABLET ORAL
Status: DISCONTINUED | OUTPATIENT
Start: 2025-01-18 | End: 2025-01-18

## 2025-01-17 RX ORDER — IBUPROFEN 600 MG/1
600 TABLET ORAL
Status: COMPLETED | OUTPATIENT
Start: 2025-01-18 | End: 2025-01-18

## 2025-01-18 VITALS
RESPIRATION RATE: 18 BRPM | TEMPERATURE: 98 F | SYSTOLIC BLOOD PRESSURE: 165 MMHG | WEIGHT: 190 LBS | HEART RATE: 66 BPM | DIASTOLIC BLOOD PRESSURE: 110 MMHG | HEIGHT: 68 IN | OXYGEN SATURATION: 97 % | BODY MASS INDEX: 28.79 KG/M2

## 2025-01-18 PROCEDURE — 25000003 PHARM REV CODE 250: Performed by: STUDENT IN AN ORGANIZED HEALTH CARE EDUCATION/TRAINING PROGRAM

## 2025-01-18 RX ORDER — IBUPROFEN 800 MG/1
800 TABLET ORAL EVERY 8 HOURS PRN
Qty: 15 TABLET | Refills: 0 | Status: SHIPPED | OUTPATIENT
Start: 2025-01-18 | End: 2025-01-23

## 2025-01-18 RX ORDER — TRAMADOL HYDROCHLORIDE 50 MG/1
50 TABLET ORAL EVERY 8 HOURS PRN
Qty: 15 TABLET | Refills: 0 | Status: SHIPPED | OUTPATIENT
Start: 2025-01-18 | End: 2025-01-23

## 2025-01-18 RX ORDER — AMOXICILLIN AND CLAVULANATE POTASSIUM 875; 125 MG/1; MG/1
1 TABLET, FILM COATED ORAL EVERY 12 HOURS
Qty: 10 TABLET | Refills: 0 | Status: SHIPPED | OUTPATIENT
Start: 2025-01-18 | End: 2025-01-23

## 2025-01-18 RX ORDER — TRAMADOL HYDROCHLORIDE 50 MG/1
50 TABLET ORAL
Status: DISCONTINUED | OUTPATIENT
Start: 2025-01-18 | End: 2025-01-18

## 2025-01-18 RX ORDER — CHLORHEXIDINE GLUCONATE ORAL RINSE 1.2 MG/ML
15 SOLUTION DENTAL 2 TIMES DAILY
Qty: 473 ML | Refills: 0 | Status: SHIPPED | OUTPATIENT
Start: 2025-01-18 | End: 2025-02-03

## 2025-01-18 RX ADMIN — HYDROCODONE BITARTRATE AND ACETAMINOPHEN 1 TABLET: 7.5; 325 TABLET ORAL at 12:01

## 2025-01-18 RX ADMIN — IBUPROFEN 600 MG: 600 TABLET, FILM COATED ORAL at 12:01

## 2025-01-18 NOTE — ED PROVIDER NOTES
Encounter Date: 1/17/2025       History     Chief Complaint   Patient presents with    Dental Pain     Dental pain to R lower area. In process of removing tooth.      HPI    Patient is a 44-year-old male with a past medical history of hypertension, hyperlipidemia presents to the emergency department for right upper dental pain.  States has seen Dentistry previously.  Is scheduled to has a tooth removed but procedure was cost prohibitive.    Review of patient's allergies indicates:  No Known Allergies  Past Medical History:   Diagnosis Date    HTN (hypertension)     Hyperlipidemia     Obesity, unspecified      No past surgical history on file.  Family History   Problem Relation Name Age of Onset    Hypertension Mother      Diabetes Mother      Diabetes Brother       Social History     Tobacco Use    Smoking status: Every Day     Current packs/day: 1.00     Types: Cigarettes    Smokeless tobacco: Never   Substance Use Topics    Alcohol use: Not Currently    Drug use: Yes     Types: Marijuana     Review of Systems   Constitutional:  Negative for fever.   HENT:  Negative for sore throat.    Eyes:  Negative for visual disturbance.   Respiratory:  Negative for shortness of breath.    Cardiovascular:  Negative for chest pain.   Gastrointestinal:  Negative for abdominal pain.   Genitourinary:  Negative for dysuria.   Musculoskeletal:  Negative for joint swelling.   Skin:  Negative for rash.   Neurological:  Negative for weakness.   Psychiatric/Behavioral:  Negative for confusion.    All other systems reviewed and are negative.      Physical Exam     Initial Vitals [01/17/25 2340]   BP Pulse Resp Temp SpO2   (!) 186/115 66 18 97.9 °F (36.6 °C) 97 %      MAP       --         Physical Exam    Nursing note and vitals reviewed.  Constitutional: He appears well-developed and well-nourished. He is not diaphoretic. No distress.   HENT:   Head: Normocephalic and atraumatic.   Fractured 2nd right upper molar.  Surrounding redness.    Eyes: Conjunctivae and EOM are normal. Pupils are equal, round, and reactive to light.   Neck:   Normal range of motion.  Cardiovascular:  Normal rate, regular rhythm, normal heart sounds and intact distal pulses.           No murmur heard.  Pulmonary/Chest: Breath sounds normal. No respiratory distress. He has no wheezes. He has no rales.   Abdominal: Abdomen is soft. He exhibits no distension. There is no abdominal tenderness.   Musculoskeletal:         General: No tenderness or edema. Normal range of motion.      Cervical back: Normal range of motion.     Neurological: He is alert and oriented to person, place, and time. No cranial nerve deficit.   Skin: Skin is warm and dry. Capillary refill takes less than 2 seconds. No rash noted. No erythema.   Psychiatric: He has a normal mood and affect.         ED Course   Procedures  Labs Reviewed - No data to display       Imaging Results    None          Medications   ibuprofen tablet 600 mg (600 mg Oral Given 1/18/25 0007)     Medical Decision Making  Judging by the patient's chief complaint and pertinent history, the patient has the following possible differential diagnoses, including but not limited to the following.  Some of these are deemed to be lower likelihood and some more likely based on my physical exam and history combined with possible lab work and/or imaging studies.   Please see the pertinent studies, and refer to the HPI.  Some of these diagnoses will take further evaluation to fully rule out, perhaps as an outpatient and the patient was encouraged to follow up when discharged for more comprehensive evaluation.    odontalgia, dental caries, Bonifacio's angina, dental abscess, periapical abscess,     Patient is a 44-year-old male presents to the emergency department complaining of dental pain.  See HPI.  See physical exam.  Given pain medication Norco and ibuprofen here.  Will prescribe for Augmentin, Peridex, antibiotics and tramadol.  Discussed need for  follow-up with dentistry.  There is no appreciable submandibular swelling.  Afebrile.  Low suspicion for any PTA or RPA.  Reassessed patient.  Patient is resting comfortably.  Discussed all results.  Discussed need for follow-up.  Discussed return precautions.  Answered all questions at this time.  Hemodynamically stable for continued outpatient management with strict return precautions.  Patient verbalized understanding agreed to plan.      Problems Addressed:  Dentalgia: acute illness or injury that poses a threat to life or bodily functions  Pain, dental: acute illness or injury that poses a threat to life or bodily functions  Toothache: acute illness or injury that poses a threat to life or bodily functions                                      Clinical Impression:  Final diagnoses:  [K08.89] Pain, dental (Primary)  [K08.89] Dentalgia  [K08.89] Toothache          ED Disposition Condition    Discharge Stable          ED Prescriptions       Medication Sig Dispense Start Date End Date Auth. Provider    amoxicillin-clavulanate 875-125mg (AUGMENTIN) 875-125 mg per tablet Take 1 tablet by mouth every 12 (twelve) hours. for 5 days 10 tablet 1/18/2025 1/23/2025 Yayo Jones MD    chlorhexidine (PERIDEX) 0.12 % solution Use as directed 15 mLs in the mouth or throat 2 (two) times daily. for 16 days 473 mL 1/18/2025 2/3/2025 Yayo Jones MD    ibuprofen (ADVIL,MOTRIN) 800 MG tablet Take 1 tablet (800 mg total) by mouth every 8 (eight) hours as needed for Pain. 15 tablet 1/18/2025 1/23/2025 Yayo Jones MD    traMADoL (ULTRAM) 50 mg tablet Take 1 tablet (50 mg total) by mouth every 8 (eight) hours as needed for Pain. 15 tablet 1/18/2025 1/23/2025 Yayo Jones MD          Follow-up Information    None          Yayo Jones MD  01/18/25 0258

## 2025-01-18 NOTE — DISCHARGE INSTRUCTIONS
Follow-up with dentistry.      Take Augmentin, Peridex as prescribed.      CLINIC ADDRESS PHONE # INSURANCE   [facility] [address] [place] [address] [phone] Medicaid/Medicare   Dr. Gareth Clifford, DDS  [address] [place] [address] [phone] Medicaid   [facility] [address] [place] [address] [phone] Medicaid   [facility] [address] [place] [place] [address] [phone] Medicare [facility]  [address] [place] [address] [phone] Medicaid/Medicare   [facility] [address]  [place], [place] [id number] [phone] Medicaid   [facility]  [address]  [place], [place] [address] [phone] Medicaid for ages 5 and under; or for lip/tongue tie    Dr. Avni Harden, DDS [address] [place] [address] [phone] Medicaid-only for children ages 2 to 21   [facility] [address]  [place] [address] [phone] Medicaid   [facility] [address] [place] [address] [phone] Medicare [facility] [address] [place] [address] [phone] Medicaid   [facility] [address] [place] [address] [phone] Medicaid-Pediatric dentistry    [facility] [address] [place] [address] [phone] Medicaid/Medicare   [facility] [address] [place] [address] [phone] Medicaid/Medicare   [facility] [address]  [place] [address] [phone] Medicaid/Medicare   [facility] [address] [place] [address] [phone] Medicaid/Medicare   [place] [address] [address] [phone] Medicaid/Medicare   [facility] [address]  [place]  [address] [phone] Medicaid-Pediatric and adult   [facility] [address]  [place] [address] [phone] Medicaid   [facility] [address] [place] [address] [phone] Medicare   Dr. Mabel Andrew,  [place] [address] [phone] Medicare for dentures only

## 2025-02-10 ENCOUNTER — CLINICAL SUPPORT (OUTPATIENT)
Dept: REHABILITATION | Facility: HOSPITAL | Age: 45
End: 2025-02-10
Payer: MEDICAID

## 2025-02-10 DIAGNOSIS — M54.2 TENDERNESS OF NECK: ICD-10-CM

## 2025-02-10 DIAGNOSIS — M54.2 CERVICAL PAIN: Primary | ICD-10-CM

## 2025-02-10 DIAGNOSIS — R29.3 POSTURE ABNORMALITY: ICD-10-CM

## 2025-02-10 DIAGNOSIS — R29.898 IMPAIRED FLEXIBILITY OF UPPER EXTREMITY: ICD-10-CM

## 2025-02-10 PROCEDURE — 97162 PT EVAL MOD COMPLEX 30 MIN: CPT
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
## 2025-02-17 DIAGNOSIS — M54.2 CERVICAL PAIN: Primary | ICD-10-CM

## 2025-02-20 ENCOUNTER — CLINICAL SUPPORT (OUTPATIENT)
Dept: REHABILITATION | Facility: HOSPITAL | Age: 45
End: 2025-02-20
Payer: MEDICAID

## 2025-02-20 DIAGNOSIS — M54.2 CERVICAL PAIN: Primary | ICD-10-CM

## 2025-02-20 DIAGNOSIS — R29.3 POSTURE ABNORMALITY: ICD-10-CM

## 2025-02-20 DIAGNOSIS — R29.898 IMPAIRED FLEXIBILITY OF UPPER EXTREMITY: ICD-10-CM

## 2025-02-20 DIAGNOSIS — M54.2 TENDERNESS OF NECK: ICD-10-CM

## 2025-02-20 PROCEDURE — 97110 THERAPEUTIC EXERCISES: CPT | Mod: CQ

## 2025-02-20 NOTE — PROGRESS NOTES
Outpatient Rehab    Physical Therapy Visit    Patient Name: Austyn Parks  MRN: [id number]  YOB: 1980  Today's Date: 2/20/2025    Therapy Diagnosis: No diagnosis found.  Physician: Wander Car FNP    Physician Orders: Eval and Treat  Medical Diagnosis: Cervical pain    Visit # / Visits Authorized:  1 / 10   Date of Evaluation:  2/11/25  Insurance Authorization Period: 2/17/25 to 4/18/25  Plan of Care Certification:  2/17/25 to 4/18/25    PTA Visit: 1/5       Time In: 0940   Time Out: 1015  Total Time: 35       FOTO:  Intake Score:  %  Survey Score 1:  %  Survey Score 2:  %         Subjective   Patient states he has been doing HEP without any issues. Reports no pain today..  Pain reported as 0/10.      Objective            Treatment:  Therapeutic Exercise  Therapeutic Exercise Activity 1: Theraband Strengthening for cervical / thoracic musculature (see flow sheet)  Therapeutic Exercise Activity 2: Cervical and thoracic stretching (see flow sheet)  Therapeutic Exercise Activity 3: Supine head press / elbow press  Therapeutic Exercise Activity 4: YTA's on stability ball (see flow sheet)    Assessment & Plan   Assessment: Austyn demonstrated a positive response to the initiation of strengthening, stretching, and ROM exercises as evidenced by ability to complete all repetitions with good effort and no report of increased pain. HE did note muscle fatigue with Theraband diagonals. Moderate verbal and tactile cues required for form. He is making good progress toward goals as evidenced by regular participation in HEP, and report of no pain.  Evaluation/Treatment Tolerance: Patient tolerated treatment well    Patient will continue to benefit from skilled outpatient physical therapy to address the deficits listed in the problem list box on initial evaluation, provide pt/family education and to maximize pt's level of independence in the home and community environment.     Patient's spiritual, cultural, and  educational needs considered and patient agreeable to plan of care and goals.     Education              - importance and benefits of performing HEP daily for optimal improvements even after discharged from PT - importance of maintaining good cervical and thoracic posture with daily activities to reduce stress on spine - importance of stretching anterior chest muscles and strengthening posterior upper back muscles to improve posture - how cervical traction works to reduce symptoms related to nerve impingement that could be causing symptoms into UE        Plan: Continue with current plan of care and progress as tolerated.    Goals:   Active       Functional outcome       Patient stated goal: no pain with daily activities    (Progressing)       Start:  02/11/25    Expected End:  03/28/25            Patient will demonstrate independence in home program for support of progression (Progressing)       Start:  02/11/25    Expected End:  03/28/25               Pain       Patient will report pain of 0/10 demonstrating a reduction of overall pain (Progressing)       Start:  02/11/25    Expected End:  03/28/25               Range of Motion       Patient will achieve right cervical rotation and side bending ROM to 100% with 0/10 pain level (Progressing)       Start:  02/11/25    Expected End:  03/28/25                Ramon Hernandez, PTA

## 2025-02-25 ENCOUNTER — CLINICAL SUPPORT (OUTPATIENT)
Dept: REHABILITATION | Facility: HOSPITAL | Age: 45
End: 2025-02-25
Payer: MEDICAID

## 2025-02-25 DIAGNOSIS — R29.898 IMPAIRED FLEXIBILITY OF UPPER EXTREMITY: ICD-10-CM

## 2025-02-25 DIAGNOSIS — R29.3 POSTURE ABNORMALITY: ICD-10-CM

## 2025-02-25 DIAGNOSIS — M54.2 CERVICAL PAIN: Primary | ICD-10-CM

## 2025-02-25 PROCEDURE — 97110 THERAPEUTIC EXERCISES: CPT | Mod: CQ

## 2025-02-25 NOTE — PROGRESS NOTES
Outpatient Rehab    Physical Therapy Visit    Patient Name: Austyn Parks  MRN: [id number]  YOB: 1980  Today's Date: 2/25/2025    Therapy Diagnosis:   Encounter Diagnoses   Name Primary?    Cervical pain Yes    Posture abnormality     Impaired flexibility of upper extremity      Physician: Wander Car FNP    Physician Orders: Eval and Treat  Medical Diagnosis: Cervical pain    Visit # / Visits Authorized:  2 / 10   Date of Evaluation:  2/11/25  Insurance Authorization Period: 2/17/25 to 4/18/25  Plan of Care Certification:  2/17/25 to 4/18/25    PTA Visit: 2/5       Time In: 1120   Time Out: 1145  Total Time: 25       FOTO:  Intake Score:  %  Survey Score 1:  %  Survey Score 2:  %         Subjective   patient stated he has some pain in the right side of his neck today..  Pain reported as 2/10. R neck    Objective            Treatment:  Therapeutic Exercise  Therapeutic Exercise Activity 1: Theraband Strengthening for cervical / thoracic musculature (see flow sheet)  Therapeutic Exercise Activity 4: YTA's on stability ball (see flow sheet)  Therapeutic Exercise Activity 5: Quadruped: thoracic rotation    Assessment & Plan   Assessment: Austyn demonstrated a positive response to today's session as evidenced by fair effort with additional exercises. Added thoracic mobility exercises to address decreased muscle flexibility and tightness in upper back. Muscle fatigue noted during quadruped thoracic rotation. He continues to require verbal and tactile cues required for form. He is making good progress toward goals despite report of mild pain today however deficits are noted with thoracic mobility and intermittent pain in the right neck.  Evaluation/Treatment Tolerance: Patient tolerated treatment well    Patient will continue to benefit from skilled outpatient physical therapy to address the deficits listed in the problem list box on initial evaluation, provide pt/family education and to maximize pt's  level of independence in the home and community environment.     Patient's spiritual, cultural, and educational needs considered and patient agreeable to plan of care and goals.     Education  Education was done with Patient. The patient's learning style includes Listening. The patient Verbalizes understanding.          - importance and benefits of performing HEP daily for optimal improvements even after discharged from PT - importance of maintaining good cervical and thoracic posture with daily activities to reduce stress on spine - importance of stretching anterior chest muscles and strengthening posterior upper back muscles to improve posture - how cervical traction works to reduce symptoms related to nerve impingement that could be causing symptoms into UE        Plan: Continue with current plan of care and progress as tolerated.    Goals:   Active       Functional outcome       Patient stated goal: no pain with daily activities    (Progressing)       Start:  02/11/25    Expected End:  03/28/25            Patient will demonstrate independence in home program for support of progression (Progressing)       Start:  02/11/25    Expected End:  03/28/25               Pain       Patient will report pain of 0/10 demonstrating a reduction of overall pain (Progressing)       Start:  02/11/25    Expected End:  03/28/25               Range of Motion       Patient will achieve right cervical rotation and side bending ROM to 100% with 0/10 pain level (Progressing)       Start:  02/11/25    Expected End:  03/28/25                Lulu Green PTA

## 2025-02-27 ENCOUNTER — CLINICAL SUPPORT (OUTPATIENT)
Dept: REHABILITATION | Facility: HOSPITAL | Age: 45
End: 2025-02-27
Payer: MEDICAID

## 2025-02-27 DIAGNOSIS — M54.2 TENDERNESS OF NECK: ICD-10-CM

## 2025-02-27 DIAGNOSIS — R29.3 POSTURE ABNORMALITY: ICD-10-CM

## 2025-02-27 DIAGNOSIS — M54.2 CERVICAL PAIN: Primary | ICD-10-CM

## 2025-02-27 PROCEDURE — 97110 THERAPEUTIC EXERCISES: CPT | Mod: CQ

## 2025-02-27 PROCEDURE — 97140 MANUAL THERAPY 1/> REGIONS: CPT | Mod: CQ

## 2025-02-27 NOTE — PROGRESS NOTES
Outpatient Rehab    Physical Therapy Visit    Patient Name: Austyn Parks  MRN: [id number]  YOB: 1980  Encounter Date: 2/27/2025    Therapy Diagnosis:   Encounter Diagnoses   Name Primary?    Cervical pain Yes    Tenderness of neck     Posture abnormality      Physician: Wander Car FNP    Physician Orders: Eval and Treat  Medical Diagnosis: Cervical pain     Visit # / Visits Authorized:  3 / 10   Date of Evaluation:  2/11/25  Insurance Authorization Period: 2/17/25 to 4/18/25  Plan of Care Certification:  2/17/25 to 4/18/25     PTA Visit: 3/5       Time In: 1050   Time Out: 1135  Total Time: 45     FOTO:  Intake Score:  %  Survey Score 1:  %  Survey Score 2:  %         Subjective   Pt reports no pain today, and he has been doing his HEP..  Pain reported as 0/10. R neck    Objective            Treatment:  Therapeutic Exercise  Therapeutic Exercise Activity 1: Theraband Strengthening for cervical / thoracic musculature (see flow sheet)  Therapeutic Exercise Activity 3: Supine head press / elbow press  Therapeutic Exercise Activity 4: YTA's on stability ball (see flow sheet)  Therapeutic Exercise Activity 5: Quadruped: thoracic rotation / cat cow    Manual Therapy  Manual Therapy Activity 1: Soft tissue mobilizations to B upper traps and cervical musculature  Manual Therapy Activity 2: Manual cervical traction    Assessment & Plan   Assessment: Austyn demonstrated a positive response to treatment today as evidenced by ability to complete all exercises with good effort and no report of increased pain. Muscle fatigue noted during quadruped thoracic rotation. Relief of muscle tension reported after soft tissue mobs to cervical musculature and manual traction. He continues to require verbal and tactile cues required for form. He is making good progress toward goals despite report of mild pain today however deficits are noted with thoracic mobility and intermittent pain in the right neck.       Patient will  continue to benefit from skilled outpatient physical therapy to address the deficits listed in the problem list box on initial evaluation, provide pt/family education and to maximize pt's level of independence in the home and community environment.     Patient's spiritual, cultural, and educational needs considered and patient agreeable to plan of care and goals.           Plan: Continue with current plan of care and progress as tolerated.    Goals:   Active       Functional outcome       Patient stated goal: no pain with daily activities    (Progressing)       Start:  02/11/25    Expected End:  03/28/25            Patient will demonstrate independence in home program for support of progression (Progressing)       Start:  02/11/25    Expected End:  03/28/25               Pain       Patient will report pain of 0/10 demonstrating a reduction of overall pain (Progressing)       Start:  02/11/25    Expected End:  03/28/25               Range of Motion       Patient will achieve right cervical rotation and side bending ROM to 100% with 0/10 pain level (Progressing)       Start:  02/11/25    Expected End:  03/28/25                Ramon Hernandez, PTA

## 2025-03-05 ENCOUNTER — CLINICAL SUPPORT (OUTPATIENT)
Dept: REHABILITATION | Facility: HOSPITAL | Age: 45
End: 2025-03-05
Payer: MEDICAID

## 2025-03-05 DIAGNOSIS — R29.3 POSTURE ABNORMALITY: ICD-10-CM

## 2025-03-05 DIAGNOSIS — M54.2 CERVICAL PAIN: Primary | ICD-10-CM

## 2025-03-05 PROCEDURE — 97110 THERAPEUTIC EXERCISES: CPT | Mod: CQ

## 2025-03-06 NOTE — PROGRESS NOTES
Outpatient Rehab    Physical Therapy Visit    Patient Name: Austyn Parks  MRN: [id number]  YOB: 1980  Encounter Date: 3/5/2025    Therapy Diagnosis:   Encounter Diagnoses   Name Primary?    Cervical pain Yes    Posture abnormality      Physician: Wander Car FNP    Physician Orders: Eval and Treat  Medical Diagnosis: Cervical pain     Visit # / Visits Authorized:  4 / 10   Date of Evaluation:  2/11/25  Insurance Authorization Period: 2/17/25 to 4/18/25  Plan of Care Certification:  2/17/25 to 4/18/25     PTA Visit: 4/5       Time In: 1535   Time Out: 1620  Total Time: 45     FOTO:  Intake Score:  %  Survey Score 1:  %  Survey Score 2:  %         Subjective   No new issues reported today..  Pain reported as 0/10.      Objective            Treatment:  Therapeutic Exercise  Therapeutic Exercise Activity 1: Theraband Strengthening for cervical / thoracic musculature (see flow sheet)  Therapeutic Exercise Activity 2: Cervical and thoracic stretching (see flow sheet)  Therapeutic Exercise Activity 3: Supine head press / elbow press  Therapeutic Exercise Activity 4: YTA's on stability ball to promote scapular strengthening (see flow sheet)  Therapeutic Exercise Activity 5: Quadruped: thoracic rotation / cat cow to promote thoracolumbar range of motion         Assessment & Plan   Assessment: Austyn demonstrated a positive response to treatment today as evidenced by good performance with increased resistance. Continued fatigue noted during quadruped exercises..He continues to require moderate verbal and tactile cues required for compensatory movements and postural abnormalities. He is making good progress toward goals however continues to exhibit deficits with thoracic mobility and increased muscular tone in upper traps.  Evaluation/Treatment Tolerance: Patient tolerated treatment well    Patient will continue to benefit from skilled outpatient physical therapy to address the deficits listed in the problem  list box on initial evaluation, provide pt/family education and to maximize pt's level of independence in the home and community environment.     Patient's spiritual, cultural, and educational needs considered and patient agreeable to plan of care and goals.     Education  Education was done with Patient. The patient's learning style includes Listening. The patient Verbalizes understanding.          - importance and benefits of performing HEP daily for optimal improvements even after discharged from PT - importance of maintaining good cervical and thoracic posture with daily activities to reduce stress on spine - importance of stretching anterior chest muscles and strengthening posterior upper back muscles to improve posture - how cervical traction works to reduce symptoms related to nerve impingement that could be causing symptoms into UE        Plan: Continue with current plan of care and progress as tolerated.    Goals:   Active       Functional outcome       Patient stated goal: no pain with daily activities    (Progressing)       Start:  02/11/25    Expected End:  03/28/25            Patient will demonstrate independence in home program for support of progression (Progressing)       Start:  02/11/25    Expected End:  03/28/25               Pain       Patient will report pain of 0/10 demonstrating a reduction of overall pain (Progressing)       Start:  02/11/25    Expected End:  03/28/25               Range of Motion       Patient will achieve right cervical rotation and side bending ROM to 100% with 0/10 pain level (Progressing)       Start:  02/11/25    Expected End:  03/28/25                Lulu Green PTA

## 2025-03-11 ENCOUNTER — CLINICAL SUPPORT (OUTPATIENT)
Dept: REHABILITATION | Facility: HOSPITAL | Age: 45
End: 2025-03-11
Payer: MEDICAID

## 2025-03-11 DIAGNOSIS — M54.2 CERVICAL PAIN: Primary | ICD-10-CM

## 2025-03-11 DIAGNOSIS — R29.3 POSTURE ABNORMALITY: ICD-10-CM

## 2025-03-11 PROCEDURE — 97110 THERAPEUTIC EXERCISES: CPT | Mod: CQ

## 2025-03-11 NOTE — PROGRESS NOTES
Outpatient Rehab    Physical Therapy Visit    Patient Name: Austyn Parks  MRN: [id number]  YOB: 1980  Encounter Date: 3/11/2025    Therapy Diagnosis:   Encounter Diagnoses   Name Primary?    Cervical pain Yes    Posture abnormality      Physician: Wander Car FNP    Physician Orders: Eval and Treat  Medical Diagnosis: Cervical pain     Visit # / Visits Authorized:  5 / 10   Date of Evaluation:  2/11/25  Insurance Authorization Period: 2/17/25 to 4/18/25  Plan of Care Certification:  2/17/25 to 4/18/25     PTA Visit: 5/5       Time In: 0848   Time Out: 0930  Total Time: 42     FOTO:  Intake Score:  %  Survey Score 1:  %  Survey Score 2:  %         Subjective   No new issues reported today. Patient stated he still has 4/10 pain at home but is continuing to do the HEP and he feels like it is helping..  Pain reported as 0/10. R neck    Objective            Treatment:  Therapeutic Exercise  Therapeutic Exercise Activity 1: Theraband Strengthening for cervical / thoracic musculature (see flow sheet)  Therapeutic Exercise Activity 2: Cervical and thoracic stretching (see flow sheet)  Therapeutic Exercise Activity 3: Standing head press / elbow press  Therapeutic Exercise Activity 4: YTA's on stability ball to promote scapular strengthening (see flow sheet)  Therapeutic Exercise Activity 5: Quadruped: thread the needle / cat cow to promote thoracolumbar range of motion         Assessment & Plan   Assessment: Austyn demonstrated a (+) response to treatment today as evidenced by no report of additional pain with progressions. Added mechanical rows to improve scapular stabilization and strengthening. He is making a fair progress toward goals however continues to exhibit deficits with thoracic mobility, increased muscular tone in upper traps, and postural abnormalities.  Evaluation/Treatment Tolerance: Patient tolerated treatment well    Patient will continue to benefit from skilled outpatient physical therapy  to address the deficits listed in the problem list box on initial evaluation, provide pt/family education and to maximize pt's level of independence in the home and community environment.     Patient's spiritual, cultural, and educational needs considered and patient agreeable to plan of care and goals.     Education  Education was done with Patient. The patient's learning style includes Listening. The patient Verbalizes understanding.          - importance and benefits of performing HEP daily for optimal improvements even after discharged from PT - importance of maintaining good cervical and thoracic posture with daily activities to reduce stress on spine - importance of stretching anterior chest muscles and strengthening posterior upper back muscles to improve posture - how cervical traction works to reduce symptoms related to nerve impingement that could be causing symptoms into UE        Plan: Continue with current plan of care and assess at later date for additional visits.    Goals:   Active       Functional outcome       Patient stated goal: no pain with daily activities    (Progressing)       Start:  02/11/25    Expected End:  03/28/25            Patient will demonstrate independence in home program for support of progression (Progressing)       Start:  02/11/25    Expected End:  03/28/25               Pain       Patient will report pain of 0/10 demonstrating a reduction of overall pain (Progressing)       Start:  02/11/25    Expected End:  03/28/25               Range of Motion       Patient will achieve right cervical rotation and side bending ROM to 100% with 0/10 pain level (Progressing)       Start:  02/11/25    Expected End:  03/28/25                Lulu Green PTA

## 2025-03-25 ENCOUNTER — DOCUMENTATION ONLY (OUTPATIENT)
Dept: REHABILITATION | Facility: HOSPITAL | Age: 45
End: 2025-03-25
Payer: MEDICAID

## 2025-03-25 ENCOUNTER — OFFICE VISIT (OUTPATIENT)
Dept: FAMILY MEDICINE | Facility: CLINIC | Age: 45
End: 2025-03-25
Payer: MEDICAID

## 2025-03-25 VITALS
HEIGHT: 68 IN | BODY MASS INDEX: 33.16 KG/M2 | HEART RATE: 87 BPM | SYSTOLIC BLOOD PRESSURE: 138 MMHG | DIASTOLIC BLOOD PRESSURE: 87 MMHG | OXYGEN SATURATION: 98 % | WEIGHT: 218.81 LBS | TEMPERATURE: 99 F | RESPIRATION RATE: 16 BRPM

## 2025-03-25 DIAGNOSIS — M54.50 LUMBAR PAIN: ICD-10-CM

## 2025-03-25 DIAGNOSIS — Z09 FOLLOW-UP EXAM, 3-6 MONTHS SINCE PREVIOUS EXAM: Primary | ICD-10-CM

## 2025-03-25 PROCEDURE — 3075F SYST BP GE 130 - 139MM HG: CPT | Mod: CPTII,,, | Performed by: NURSE PRACTITIONER

## 2025-03-25 PROCEDURE — 3008F BODY MASS INDEX DOCD: CPT | Mod: CPTII,,, | Performed by: NURSE PRACTITIONER

## 2025-03-25 PROCEDURE — 1159F MED LIST DOCD IN RCRD: CPT | Mod: CPTII,,, | Performed by: NURSE PRACTITIONER

## 2025-03-25 PROCEDURE — 1160F RVW MEDS BY RX/DR IN RCRD: CPT | Mod: CPTII,,, | Performed by: NURSE PRACTITIONER

## 2025-03-25 PROCEDURE — 99213 OFFICE O/P EST LOW 20 MIN: CPT | Mod: ,,, | Performed by: NURSE PRACTITIONER

## 2025-03-25 PROCEDURE — 3079F DIAST BP 80-89 MM HG: CPT | Mod: CPTII,,, | Performed by: NURSE PRACTITIONER

## 2025-03-25 NOTE — PROGRESS NOTES
[facility]  Physical Therapy Discharge Note    Name: Austyn Parks  Clinic Number: [id number]    Medical diagnosis: Cervical pain   PT diagnosis: posture abnormality    Date of Last visit: 3/11/25  Total Visits Received: 5 plus initial eval    Assessment      Austyn Parks did not return to physical therapy today for final assessment for discharge.  Austyn goals were address as listed below and met as stated.  Austyn was issued a home exercise program with handouts throughout this episode of care to reference for continued wellness and physical fitness . Contact information was given at evaluation in case any questions arise in the future or if therapy is needed.    Discharge reason: patient did not return for formal physical therapy. Poor attendance. Pt had multiple no shows without calling to notify clinic as well as multiple cancelled visits. Pt did not attend 80% of scheduled visits    Plan   This patient is discharged from Physical Therapy.

## 2025-03-25 NOTE — PROGRESS NOTES
"  SUBJECTIVE:     History of Present Illness      Chief Complaint: Follow-up (6 month follow up visit.  No complaints at this time)    HPI:  Patient is a 44 y.o. year old male who presents to clinic for six-month follow-up.  Patient doing well today no complaints.  States that he has a rescheduled physical therapy due to a recent death in his family.  Hypertension has been well controlled without medication.  Diet and exercise.    Review of Systems:    Review of Systems    12 point review of systems conducted, negative except as stated in the history of present illness. See HPI for details.     Previous History      PCP: Wander Car FNP  Review of patient's allergies indicates:  No Known Allergies    Past Medical History:   Diagnosis Date    Asthma     HTN (hypertension)     Hyperlipidemia     Obesity, unspecified        Past Surgical History:   Procedure Laterality Date    FOOT SURGERY Right      Family History   Problem Relation Name Age of Onset    Hypertension Mother      Diabetes Mother      Diabetes Brother         Social History[1]     Health Maintenance      Health Maintenance   Topic Date Due    TETANUS VACCINE  Never done    Pneumococcal Vaccines (Age 0-49) (1 of 2 - PCV) Never done    Colorectal Cancer Screening  Never done    Influenza Vaccine (1) Never done    COVID-19 Vaccine (1 - 2024-25 season) Never done    Hemoglobin A1c (Diabetic Prevention Screening)  10/28/2027    Lipid Panel  10/28/2029    RSV Vaccine (Age 60+ and Pregnant patients) (1 - 1-dose 75+ series) 08/10/2054    Hepatitis C Screening  Completed    HIV Screening  Completed       OBJECTIVE:     Physical Exam      Vital Signs Reviewed   Visit Vitals  /87 (BP Location: Left arm, Patient Position: Sitting)   Pulse 87   Temp 98.7 °F (37.1 °C) (Oral)   Resp 16   Ht 5' 8" (1.727 m)   Wt 99.2 kg (218 lb 12.8 oz)   SpO2 98%   BMI 33.27 kg/m²       Physical Exam    Physical Exam:  General: Alert, well nourished, no acute distress, " non-toxic appearing.   Eyes: Anicteric sclera, without conjunctival injection, normal lids, no purulent drainage, EOMs grossly intact.   Ears: No tragal tenderness. Tympanic membranes intact, pearly grey, without effusion or erythema and with a positive light reflex.   Mouth: Posterior pharynx without erythema. No exudate, ulcerations, or lesion. No tonsillar swelling.   Neck: Supple, full ROM, no rigidity, no cervical adenopathy.   Cardio: Normal rate and rhythm    Resp: Respirations even and unlabored, clear to auscultation bilaterally.   Abd: No ecchymosis or distension. Normal bowel sounds in all 4 quadrants. No tenderness to palpation. No rebound tenderness or guarding. No CVA tenderness.   Skin: No rashes or open lesions noted.   MSK: No swelling. No abrasions or signs of trauma. Ambulating without assistance.   Neuro: Alert,oriented No focal deficits noted. Facial expressions even.   Psych: Cooperative, Normal affect      Labs   Chemistry:  Lab Results   Component Value Date     10/28/2024    K 4.1 10/28/2024    BUN 9.4 10/28/2024    CREATININE 1.08 10/28/2024    EGFRNORACEVR >60 10/28/2024    GLUCOSE 105 (H) 10/28/2024    CALCIUM 9.7 10/28/2024    ALKPHOS 96 10/28/2024    LABPROT 7.5 10/28/2024    ALBUMIN 4.0 10/28/2024    BILIDIR 0.10 12/02/2017    IBILI 0.31 12/02/2017    AST 24 10/28/2024    ALT 17 10/28/2024    [id number] 28 (L) 10/28/2024    TSH 2.281 10/28/2024    PSA 1.41 10/28/2024        Lab Results   Component Value Date    HGBA1C 5.3 10/28/2024        Hematology:  Lab Results   Component Value Date    WBC 10.08 10/28/2024    HGB 14.6 10/28/2024    HCT 45.4 10/28/2024     10/28/2024       Lipid Panel:  Lab Results   Component Value Date    CHOL 200 10/28/2024    HDL 33 (L) 10/28/2024    .00 10/28/2024    TRIG 182 (H) 10/28/2024    TOTALCHOLEST 6 (H) 10/28/2024        Urine:  Lab Results   Component Value Date    APPEARANCEUA Clear 12/14/2022    SGUA 1.020 12/14/2022     PROTEINUA Negative 12/14/2022    KETONESUA 40 (A) 12/14/2022    LEUKOCYTESUR Negative 12/14/2022    RBCUA None Seen 12/14/2022    WBCUA None Seen 12/14/2022    BACTERIA None Seen 12/14/2022         Assessment       No diagnosis found.    Plan       Assessment & Plan  Follow-up exam, 3-6 months since previous exam  Stable            Lumbar pain  Stable  continue PT                 Medication List with Changes/Refills   Current Medications    ALBUTEROL (PROVENTIL/VENTOLIN HFA) 90 MCG/ACTUATION INHALER    Inhale 2 puffs into the lungs every 4 (four) hours as needed for Wheezing. Rescue         No follow-ups on file. In addition to their scheduled follow up, the patient has also been instructed to follow up on as needed basis.   Future Appointments   Date Time Provider Department Center   11/5/2025  9:00 AM Wander Car FNP [facility]       RENETTA Ibarra              [1]   Social History  Tobacco Use    Smoking status: Every Day     Current packs/day: 1.00     Types: Cigarettes    Smokeless tobacco: Never   Substance Use Topics    Alcohol use: Not Currently    Drug use: Yes     Types: Marijuana

## 2025-04-10 ENCOUNTER — TELEPHONE (OUTPATIENT)
Dept: FAMILY MEDICINE | Facility: CLINIC | Age: 45
End: 2025-04-10
Payer: MEDICAID

## 2025-04-10 DIAGNOSIS — M54.50 LUMBAR PAIN: Primary | ICD-10-CM

## 2025-05-14 ENCOUNTER — TELEPHONE (OUTPATIENT)
Dept: FAMILY MEDICINE | Facility: CLINIC | Age: 45
End: 2025-05-14
Payer: MEDICAID

## 2025-05-14 NOTE — TELEPHONE ENCOUNTER
Pt would like a referral sent in for physical therapy. Pt was advised to schedule appt with provider to discuss further.

## 2025-06-05 ENCOUNTER — OFFICE VISIT (OUTPATIENT)
Dept: FAMILY MEDICINE | Facility: CLINIC | Age: 45
End: 2025-06-05
Payer: MEDICAID

## 2025-06-05 VITALS
DIASTOLIC BLOOD PRESSURE: 122 MMHG | SYSTOLIC BLOOD PRESSURE: 160 MMHG | RESPIRATION RATE: 18 BRPM | TEMPERATURE: 98 F | WEIGHT: 223.88 LBS | OXYGEN SATURATION: 98 % | HEIGHT: 68 IN | BODY MASS INDEX: 33.93 KG/M2 | HEART RATE: 78 BPM

## 2025-06-05 DIAGNOSIS — M54.9 BACK PAIN, UNSPECIFIED BACK LOCATION, UNSPECIFIED BACK PAIN LATERALITY, UNSPECIFIED CHRONICITY: Primary | ICD-10-CM

## 2025-06-05 DIAGNOSIS — I10 HYPERTENSION, UNSPECIFIED TYPE: ICD-10-CM

## 2025-06-05 DIAGNOSIS — Z12.11 SCREENING FOR MALIGNANT NEOPLASM OF COLON: ICD-10-CM

## 2025-06-05 RX ORDER — METHOCARBAMOL 500 MG/1
500 TABLET, FILM COATED ORAL 2 TIMES DAILY PRN
Qty: 20 TABLET | Refills: 0 | Status: SHIPPED | OUTPATIENT
Start: 2025-06-05 | End: 2025-06-15

## 2025-06-26 ENCOUNTER — PATIENT MESSAGE (OUTPATIENT)
Facility: CLINIC | Age: 45
End: 2025-06-26
Payer: MEDICAID

## 2025-07-14 ENCOUNTER — TELEPHONE (OUTPATIENT)
Dept: FAMILY MEDICINE | Facility: CLINIC | Age: 45
End: 2025-07-14
Payer: MEDICAID

## 2025-07-14 ENCOUNTER — OFFICE VISIT (OUTPATIENT)
Dept: FAMILY MEDICINE | Facility: CLINIC | Age: 45
End: 2025-07-14
Payer: MEDICAID

## 2025-07-14 VITALS
OXYGEN SATURATION: 99 % | DIASTOLIC BLOOD PRESSURE: 96 MMHG | TEMPERATURE: 98 F | RESPIRATION RATE: 18 BRPM | HEIGHT: 68 IN | WEIGHT: 225.31 LBS | HEART RATE: 76 BPM | BODY MASS INDEX: 34.15 KG/M2 | SYSTOLIC BLOOD PRESSURE: 163 MMHG

## 2025-07-14 DIAGNOSIS — Z72.0 TOBACCO USE: ICD-10-CM

## 2025-07-14 DIAGNOSIS — I10 HYPERTENSION, UNSPECIFIED TYPE: Primary | ICD-10-CM

## 2025-07-14 PROCEDURE — 3077F SYST BP >= 140 MM HG: CPT | Mod: CPTII,,, | Performed by: NURSE PRACTITIONER

## 2025-07-14 PROCEDURE — 1159F MED LIST DOCD IN RCRD: CPT | Mod: CPTII,,, | Performed by: NURSE PRACTITIONER

## 2025-07-14 PROCEDURE — 99214 OFFICE O/P EST MOD 30 MIN: CPT | Mod: 25,,, | Performed by: NURSE PRACTITIONER

## 2025-07-14 PROCEDURE — 3080F DIAST BP >= 90 MM HG: CPT | Mod: CPTII,,, | Performed by: NURSE PRACTITIONER

## 2025-07-14 PROCEDURE — 1160F RVW MEDS BY RX/DR IN RCRD: CPT | Mod: CPTII,,, | Performed by: NURSE PRACTITIONER

## 2025-07-14 PROCEDURE — 3008F BODY MASS INDEX DOCD: CPT | Mod: CPTII,,, | Performed by: NURSE PRACTITIONER

## 2025-07-14 PROCEDURE — 99406 BEHAV CHNG SMOKING 3-10 MIN: CPT | Mod: ,,, | Performed by: NURSE PRACTITIONER

## 2025-07-14 RX ORDER — HYDROCHLOROTHIAZIDE 12.5 MG/1
12.5 TABLET ORAL DAILY
Qty: 30 TABLET | Refills: 1 | Status: SHIPPED | OUTPATIENT
Start: 2025-07-14 | End: 2026-07-14

## 2025-07-14 NOTE — ASSESSMENT & PLAN NOTE
In office clonidine declined.  Discussed with patient risks heart attack, stroke from uncontrolled hypertension    Low salt/ DASH diet   Discussed lifestyle modifications.   encourage aerobic excise at least 30 mins a day   Monitor BP daily goal less than 140/90.   Denies headaches, blurred vision, or dizziness    Rx for hydrochlorothiazide.  Recommend patient  and start medication.  Patient reluctant to take medication  He would like to try therapeutic lifestyle changes  Orders:    hydroCHLOROthiazide 12.5 MG Tab; Take 1 tablet (12.5 mg total) by mouth once daily.

## 2025-07-14 NOTE — PROGRESS NOTES
"SUBJECTIVE:     History of Present Illness      Chief Complaint: Follow-up (Pt has no complaints)    HPI:  Patient is a 44 y.o. year old male who presents to clinic for hypertension follow-up.  Patient reports he has not been eating as healthy as he normally does has not been exercising.  Reports blood pressure is better controlled when his diet is better.  He denies headaches, dizziness or blurred vision.  Patient reluctant to start medication for blood pressure control.    Review of Systems:    Review of Systems    12 point review of systems conducted, negative except as stated in the history of present illness. See HPI for details.     Previous History      PCP: Wander Car FNP  Review of patient's allergies indicates:  No Known Allergies    Past Medical History:   Diagnosis Date    Asthma     HTN (hypertension)     Hyperlipidemia     Obesity, unspecified        Past Surgical History:   Procedure Laterality Date    FOOT SURGERY Right      Family History   Problem Relation Name Age of Onset    Hypertension Mother      Diabetes Mother      Diabetes Brother         Social History[1]     Health Maintenance      Health Maintenance   Topic Date Due    TETANUS VACCINE  Never done    Pneumococcal Vaccines (Age 0-49) (1 of 2 - PCV) Never done    Colorectal Cancer Screening  Never done    COVID-19 Vaccine (1 - 2024-25 season) Never done    Influenza Vaccine (1) 09/01/2025    Hemoglobin A1c (Diabetic Prevention Screening)  10/28/2027    Lipid Panel  10/28/2029    RSV Vaccine (Age 60+ and Pregnant patients) (1 - 1-dose 75+ series) 08/10/2054    Hepatitis C Screening  Completed    HIV Screening  Completed       OBJECTIVE:     Physical Exam      Vital Signs Reviewed   Visit Vitals  BP (!) 163/96   Pulse 76   Temp 98.2 °F (36.8 °C) (Oral)   Resp 18   Ht 5' 8" (1.727 m)   Wt 102.2 kg (225 lb 4.8 oz)   SpO2 99%   BMI 34.26 kg/m²       Physical Exam    Physical Exam:  General: Alert, well nourished, no acute distress, " non-toxic appearing.   Eyes: Anicteric sclera, without conjunctival injection, normal lids, no purulent drainage, EOMs grossly intact.   Ears: No tragal tenderness. Tympanic membranes intact, pearly grey, without effusion or erythema and with a positive light reflex.   Mouth: Posterior pharynx without erythema. No exudate, ulcerations, or lesion. No tonsillar swelling.   Neck: Supple, full ROM, no rigidity, no cervical adenopathy.   Cardio: Normal rate and rhythm    Resp: Respirations even and unlabored, clear to auscultation bilaterally.   Abd: No ecchymosis or distension. Normal bowel sounds in all 4 quadrants. No tenderness to palpation. No rebound tenderness or guarding. No CVA tenderness.   Skin: No rashes or open lesions noted.   MSK: No swelling. No abrasions or signs of trauma. Ambulating without assistance.   Neuro: Alert,oriented No focal deficits noted. Facial expressions even.   Psych: Cooperative, Normal affect      Labs   Chemistry:  Lab Results   Component Value Date     10/28/2024    K 4.1 10/28/2024    BUN 9.4 10/28/2024    CREATININE 1.08 10/28/2024    EGFRNORACEVR >60 10/28/2024    CALCIUM 9.7 10/28/2024    ALKPHOS 96 10/28/2024    ALBUMIN 4.0 10/28/2024    BILIDIR 0.10 12/02/2017    IBILI 0.31 12/02/2017    AST 24 10/28/2024    ALT 17 10/28/2024    [id number] 28 (L) 10/28/2024    TSH 2.281 10/28/2024    PSA 1.41 10/28/2024        Lab Results   Component Value Date    HGBA1C 5.3 10/28/2024        Hematology:  Lab Results   Component Value Date    WBC 10.08 10/28/2024    HGB 14.6 10/28/2024    HCT 45.4 10/28/2024     10/28/2024       Lipid Panel:  Lab Results   Component Value Date    CHOL 200 10/28/2024    HDL 33 (L) 10/28/2024    .00 10/28/2024    TRIG 182 (H) 10/28/2024    TOTALCHOLEST 6 (H) 10/28/2024        Urine:  Lab Results   Component Value Date    APPEARANCEUA Clear 12/14/2022    SGUA 1.020 12/14/2022    PROTEINUA Negative 12/14/2022    KETONESUA 40 (A) 12/14/2022     LEUKOCYTESUR Negative 12/14/2022    RBCUA None Seen 12/14/2022    WBCUA None Seen 12/14/2022    BACTERIA None Seen 12/14/2022         Assessment         ICD-10-CM ICD-9-CM   1. Hypertension, unspecified type  I10 401.9   2. Tobacco use  Z72.0 305.1       Plan       Assessment & Plan  Hypertension, unspecified type  In office clonidine declined.  Discussed with patient risks heart attack, stroke from uncontrolled hypertension    Low salt/ DASH diet   Discussed lifestyle modifications.   encourage aerobic excise at least 30 mins a day   Monitor BP daily goal less than 140/90.   Denies headaches, blurred vision, or dizziness    Rx for hydrochlorothiazide.  Recommend patient  and start medication.  Patient reluctant to take medication  He would like to try therapeutic lifestyle changes  Orders:    hydroCHLOROthiazide 12.5 MG Tab; Take 1 tablet (12.5 mg total) by mouth once daily.    Tobacco use  The patient was counseled on the dangers of tobacco use, and was advised to quit.  Reviewed strategies to maximize success, including written materials.     Patient was counseled regarding smoking for 3-10 minutes.               Orders Placed This Encounter    hydroCHLOROthiazide 12.5 MG Tab      Medication List with Changes/Refills   New Medications    HYDROCHLOROTHIAZIDE 12.5 MG TAB    Take 1 tablet (12.5 mg total) by mouth once daily.   Current Medications    ALBUTEROL (PROVENTIL/VENTOLIN HFA) 90 MCG/ACTUATION INHALER    Inhale 2 puffs into the lungs every 4 (four) hours as needed for Wheezing. Rescue         Follow up in about 6 weeks (around 8/25/2025), or if symptoms worsen or fail to improve, for Hypertension. In addition to their scheduled follow up, the patient has also been instructed to follow up on as needed basis.   Future Appointments   Date Time Provider Department Center   8/19/2025  9:15 AM Wander Car FNP [facility]   11/5/2025  9:00 AM Wander Car FNP [facility]        RENETTA Ibarra                [1]   Social History  Tobacco Use    Smoking status: Every Day     Current packs/day: 1.00     Types: Cigarettes    Smokeless tobacco: Never   Substance Use Topics    Alcohol use: Not Currently    Drug use: Yes     Types: Marijuana

## 2025-07-14 NOTE — TELEPHONE ENCOUNTER
Copied from CRM #[id number]. Topic: Appointments - Appointment Scheduling  >> Jul 14, 2025 11:28 AM Keith Zepeda wrote:  Who Called: Austyn Parks    Caller is requesting assistance/information from provider's office.    Symptoms (please be specific):    How long has patient had these symptoms:    List of preferred pharmacies on file (remove unneeded): [unfilled]  If different, enter pharmacy into here including location and phone number:       Preferred Method of Contact: Phone Call  Patient's Preferred Phone Number on File:   Best Call Back Number, if different:  Additional Information: Helena Reed (Significant other)  [phone]  please give pt a call back regarding appt. Ms Kayla. Please advise

## 2025-08-02 ENCOUNTER — HOSPITAL ENCOUNTER (EMERGENCY)
Facility: HOSPITAL | Age: 45
Discharge: HOME | End: 2025-08-02
Attending: EMERGENCY MEDICINE
Payer: MEDICAID

## 2025-08-02 VITALS
BODY MASS INDEX: 33.19 KG/M2 | HEART RATE: 66 BPM | RESPIRATION RATE: 20 BRPM | HEIGHT: 68 IN | TEMPERATURE: 98 F | SYSTOLIC BLOOD PRESSURE: 180 MMHG | DIASTOLIC BLOOD PRESSURE: 111 MMHG | OXYGEN SATURATION: 97 % | WEIGHT: 219 LBS

## 2025-08-02 DIAGNOSIS — R10.13 EPIGASTRIC ABDOMINAL PAIN: Primary | ICD-10-CM

## 2025-08-02 LAB
BACTERIA #/AREA URNS AUTO: NORMAL /HPF
BILIRUB UR QL STRIP.AUTO: ABNORMAL
CLARITY UR: CLEAR
COLOR UR AUTO: YELLOW
GLUCOSE UR QL STRIP: NEGATIVE
HGB UR QL STRIP: ABNORMAL
KETONES UR QL STRIP: >=80
LEUKOCYTE ESTERASE UR QL STRIP: NEGATIVE
NITRITE UR QL STRIP: NEGATIVE
PH UR STRIP: 6.5 [PH]
PROT UR QL STRIP: 100
RBC #/AREA URNS AUTO: NORMAL /HPF
SP GR UR STRIP.AUTO: 1.02 (ref 1–1.03)
SQUAMOUS #/AREA URNS AUTO: NORMAL /HPF
UROBILINOGEN UR STRIP-ACNC: 4
WBC #/AREA URNS AUTO: NORMAL /HPF

## 2025-08-02 PROCEDURE — 25000003 PHARM REV CODE 250: Performed by: EMERGENCY MEDICINE

## 2025-08-02 PROCEDURE — 99283 EMERGENCY DEPT VISIT LOW MDM: CPT

## 2025-08-02 PROCEDURE — 81003 URINALYSIS AUTO W/O SCOPE: CPT | Performed by: EMERGENCY MEDICINE

## 2025-08-02 RX ORDER — ALUMINUM HYDROXIDE, MAGNESIUM HYDROXIDE, AND SIMETHICONE 1200; 120; 1200 MG/30ML; MG/30ML; MG/30ML
30 SUSPENSION ORAL ONCE
Status: COMPLETED | OUTPATIENT
Start: 2025-08-02 | End: 2025-08-02

## 2025-08-02 RX ORDER — PANTOPRAZOLE SODIUM 20 MG/1
20 TABLET, DELAYED RELEASE ORAL DAILY
Qty: 15 TABLET | Refills: 0 | Status: SHIPPED | OUTPATIENT
Start: 2025-08-02 | End: 2025-08-17

## 2025-08-02 RX ORDER — LIDOCAINE HYDROCHLORIDE 20 MG/ML
15 SOLUTION OROPHARYNGEAL ONCE
Status: COMPLETED | OUTPATIENT
Start: 2025-08-02 | End: 2025-08-02

## 2025-08-02 RX ADMIN — LIDOCAINE HYDROCHLORIDE 15 ML: 20 SOLUTION ORAL at 11:08

## 2025-08-02 RX ADMIN — ALUMINUM HYDROXIDE, MAGNESIUM HYDROXIDE, AND SIMETHICONE 30 ML: 200; 200; 20 SUSPENSION ORAL at 11:08

## 2025-08-02 NOTE — ED PROVIDER NOTES
Encounter Date: 8/2/2025       History     Chief Complaint   Patient presents with    Abdominal Pain     Lower abd pain x 4 days denies n/v/d. Pain varies with movement and more at night.      This 44-year-old man complains of 4 days of abdominal pain which he describes as burning.  It is worse in the evening and he states it hurts on whatever side he lays.  He denies spicy food intake or alcohol intake.  He denies any prior symptoms.  Bowel movements have been normal and urination is normal.       Review of patient's allergies indicates:  No Known Allergies  Past Medical History:   Diagnosis Date    Asthma     HTN (hypertension)     Hyperlipidemia     Obesity, unspecified      Past Surgical History:   Procedure Laterality Date    FOOT SURGERY Right      Family History   Problem Relation Name Age of Onset    Hypertension Mother      Diabetes Mother      Diabetes Brother       Social History[1]  Review of Systems   Constitutional:  Negative for fever.   HENT:  Negative for sore throat.    Respiratory:  Negative for shortness of breath.    Cardiovascular:  Negative for chest pain.   Gastrointestinal:  Positive for abdominal pain. Negative for nausea.   Genitourinary:  Negative for dysuria.   Musculoskeletal:  Negative for back pain.   Skin:  Negative for rash.   Neurological:  Negative for weakness.   Hematological:  Does not bruise/bleed easily.       Physical Exam     Initial Vitals [08/02/25 1056]   BP Pulse Resp Temp SpO2   (!) 180/111 66 20 97.7 °F (36.5 °C) 97 %      MAP       --         Physical Exam    Nursing note and vitals reviewed.  Constitutional: He appears well-developed and well-nourished.   HENT:   Head: Normocephalic and atraumatic. Mouth/Throat: Mucous membranes are normal.   Eyes: EOM are normal. Pupils are equal, round, and reactive to light.   Neck: Neck supple.   Normal range of motion.  Cardiovascular:  Normal rate, regular rhythm, normal heart sounds and intact distal pulses.            Pulmonary/Chest: Breath sounds normal.   Abdominal: Abdomen is soft. Bowel sounds are normal. There is abdominal tenderness (epigastric).   Musculoskeletal:         General: Normal range of motion.      Cervical back: Normal range of motion and neck supple.     Neurological: He is alert and oriented to person, place, and time. He has normal strength.   Skin: Skin is warm and dry. Capillary refill takes less than 2 seconds.   Psychiatric: He has a normal mood and affect. His behavior is normal. Judgment and thought content normal.         ED Course   Procedures  Labs Reviewed   URINALYSIS, REFLEX TO URINE CULTURE - Abnormal       Result Value    Color, UA Yellow      Appearance, UA Clear      Specific Gravity, UA 1.025      pH, UA 6.5      Protein,  (*)     Glucose, UA Negative      Ketones, UA >=80 (*)     Blood, UA Trace (*)     Bilirubin, UA Small (*)     Urobilinogen, UA 4.0 (*)     Nitrites, UA Negative      Leukocyte Esterase, UA Negative     URINALYSIS, MICROSCOPIC - Normal    Bacteria, UA None Seen      RBC, UA None Seen      WBC, UA 0-2      Squamous Epithelial Cells, UA None Seen            Imaging Results    None          Medications   aluminum-magnesium hydroxide-simethicone 200-200-20 mg/5 mL suspension 30 mL (30 mLs Oral Given 8/2/25 1114)     And   LIDOcaine viscous HCl 2% oral solution 15 mL (15 mLs Oral Given 8/2/25 1114)     Medical Decision Making  The patient refused to have any labs drawn.  He did get relief with a GI cocktail.     Amount and/or Complexity of Data Reviewed  Labs: ordered.    Risk  OTC drugs.  Prescription drug management.                                          Clinical Impression:  Final diagnoses:  [R10.13] Epigastric abdominal pain (Primary)          ED Disposition Condition    Discharge Stable          ED Prescriptions       Medication Sig Dispense Start Date End Date Auth. Provider    pantoprazole (PROTONIX) 20 MG tablet Take 1 tablet (20 mg total) by mouth once  daily. for 15 days 15 tablet 8/2/2025 8/17/2025 Angel Beltran MD          Follow-up Information       Follow up With Specialties Details Why Contact Info    Wander Car, FNP Family Medicine Schedule an appointment as soon as possible for a visit  As needed, If symptoms worsen [id number]  [place] [facility] [id number]  [phone]                     [1]   Social History  Tobacco Use    Smoking status: Every Day     Current packs/day: 1.00     Types: Cigarettes    Smokeless tobacco: Never   Substance Use Topics    Alcohol use: Not Currently    Drug use: Yes     Types: Marijuana        Angel Beltran MD  08/02/25 1132

## 2025-08-02 NOTE — ED NOTES
Pt refusing lab work per phlebotomist. Informing MD.Pt states he would just like medication at this time.

## 2025-08-19 ENCOUNTER — OFFICE VISIT (OUTPATIENT)
Dept: FAMILY MEDICINE | Facility: CLINIC | Age: 45
End: 2025-08-19
Payer: MEDICAID

## 2025-08-19 VITALS
WEIGHT: 222.69 LBS | HEART RATE: 63 BPM | OXYGEN SATURATION: 99 % | BODY MASS INDEX: 33.75 KG/M2 | SYSTOLIC BLOOD PRESSURE: 197 MMHG | DIASTOLIC BLOOD PRESSURE: 96 MMHG | RESPIRATION RATE: 18 BRPM | TEMPERATURE: 98 F | HEIGHT: 68 IN

## 2025-08-19 DIAGNOSIS — Z72.0 TOBACCO USE: ICD-10-CM

## 2025-08-19 DIAGNOSIS — I10 HYPERTENSION, UNSPECIFIED TYPE: Primary | ICD-10-CM

## 2025-08-19 PROCEDURE — 1160F RVW MEDS BY RX/DR IN RCRD: CPT | Mod: CPTII,,, | Performed by: NURSE PRACTITIONER

## 2025-08-19 PROCEDURE — 99406 BEHAV CHNG SMOKING 3-10 MIN: CPT | Mod: ,,, | Performed by: NURSE PRACTITIONER

## 2025-08-19 PROCEDURE — 1159F MED LIST DOCD IN RCRD: CPT | Mod: CPTII,,, | Performed by: NURSE PRACTITIONER

## 2025-08-19 PROCEDURE — 99214 OFFICE O/P EST MOD 30 MIN: CPT | Mod: 25,,, | Performed by: NURSE PRACTITIONER

## 2025-08-19 PROCEDURE — 3075F SYST BP GE 130 - 139MM HG: CPT | Mod: CPTII,,, | Performed by: NURSE PRACTITIONER

## 2025-08-19 PROCEDURE — 3008F BODY MASS INDEX DOCD: CPT | Mod: CPTII,,, | Performed by: NURSE PRACTITIONER

## 2025-08-19 PROCEDURE — 3080F DIAST BP >= 90 MM HG: CPT | Mod: CPTII,,, | Performed by: NURSE PRACTITIONER
